# Patient Record
Sex: FEMALE | Race: BLACK OR AFRICAN AMERICAN | NOT HISPANIC OR LATINO | Employment: OTHER | RURAL
[De-identification: names, ages, dates, MRNs, and addresses within clinical notes are randomized per-mention and may not be internally consistent; named-entity substitution may affect disease eponyms.]

---

## 2017-03-31 ENCOUNTER — HISTORICAL (OUTPATIENT)
Dept: ADMINISTRATIVE | Facility: HOSPITAL | Age: 62
End: 2017-03-31

## 2017-04-04 LAB
LAB AP GENERAL CAT - HISTORICAL: NORMAL
LAB AP INTERPRETATION/RESULT - HISTORICAL: NEGATIVE
LAB AP SPECIMEN ADEQUACY - HISTORICAL: NORMAL
LAB AP SPECIMEN SUBMITTED - HISTORICAL: NORMAL

## 2018-07-25 ENCOUNTER — HISTORICAL (OUTPATIENT)
Dept: ADMINISTRATIVE | Facility: HOSPITAL | Age: 63
End: 2018-07-25

## 2018-07-27 LAB
LAB AP CLINICAL INFORMATION: NORMAL
LAB AP GENERAL CAT - HISTORICAL: NORMAL
LAB AP INTERPRETATION/RESULT - HISTORICAL: NEGATIVE
LAB AP SPECIMEN ADEQUACY - HISTORICAL: NORMAL
LAB AP SPECIMEN SUBMITTED - HISTORICAL: NORMAL

## 2019-08-21 ENCOUNTER — HISTORICAL (OUTPATIENT)
Dept: ADMINISTRATIVE | Facility: HOSPITAL | Age: 64
End: 2019-08-21

## 2019-10-02 ENCOUNTER — HISTORICAL (OUTPATIENT)
Dept: ADMINISTRATIVE | Facility: HOSPITAL | Age: 64
End: 2019-10-02

## 2019-10-02 LAB — CRC RECOMMENDATION EXT: NORMAL

## 2019-10-03 LAB
LAB AP CLINICAL INFORMATION: NORMAL
LAB AP DIAGNOSIS - HISTORICAL: NORMAL
LAB AP GROSS PATHOLOGY - HISTORICAL: NORMAL
LAB AP SPECIMEN SUBMITTED - HISTORICAL: NORMAL

## 2020-09-24 ENCOUNTER — HISTORICAL (OUTPATIENT)
Dept: ADMINISTRATIVE | Facility: HOSPITAL | Age: 65
End: 2020-09-24

## 2021-03-15 VITALS
HEART RATE: 67 BPM | DIASTOLIC BLOOD PRESSURE: 82 MMHG | HEIGHT: 65 IN | SYSTOLIC BLOOD PRESSURE: 132 MMHG | WEIGHT: 198 LBS | RESPIRATION RATE: 18 BRPM | OXYGEN SATURATION: 99 % | BODY MASS INDEX: 32.99 KG/M2

## 2021-03-15 RX ORDER — PROPRANOLOL HYDROCHLORIDE 120 MG/1
120 CAPSULE, EXTENDED RELEASE ORAL DAILY
COMMUNITY
End: 2022-03-08 | Stop reason: SDUPTHER

## 2021-03-15 RX ORDER — ZOLPIDEM TARTRATE 5 MG/1
5 TABLET ORAL NIGHTLY PRN
COMMUNITY

## 2021-03-15 RX ORDER — HYDRALAZINE HYDROCHLORIDE 10 MG/1
10 TABLET, FILM COATED ORAL EVERY 12 HOURS
COMMUNITY
End: 2022-03-08 | Stop reason: SDUPTHER

## 2021-03-15 RX ORDER — MONTELUKAST SODIUM 10 MG/1
10 TABLET ORAL NIGHTLY
COMMUNITY
End: 2021-09-07 | Stop reason: SDUPTHER

## 2021-03-15 RX ORDER — LOSARTAN POTASSIUM AND HYDROCHLOROTHIAZIDE 12.5; 1 MG/1; MG/1
1 TABLET ORAL DAILY
COMMUNITY
End: 2022-03-08 | Stop reason: SDUPTHER

## 2021-03-15 RX ORDER — CYCLOBENZAPRINE HCL 10 MG
10 TABLET ORAL 3 TIMES DAILY PRN
COMMUNITY
End: 2022-03-29 | Stop reason: SDUPTHER

## 2021-03-15 RX ORDER — FLUTICASONE PROPIONATE AND SALMETEROL 500; 50 UG/1; UG/1
1 POWDER RESPIRATORY (INHALATION) 2 TIMES DAILY
COMMUNITY
End: 2021-03-22 | Stop reason: SDUPTHER

## 2021-03-15 RX ORDER — PREGABALIN 50 MG/1
50 CAPSULE ORAL 2 TIMES DAILY
COMMUNITY
End: 2021-10-13 | Stop reason: SDUPTHER

## 2021-03-15 RX ORDER — TRAMADOL HYDROCHLORIDE 50 MG/1
50 TABLET ORAL EVERY 6 HOURS
COMMUNITY
End: 2023-10-10

## 2021-03-15 RX ORDER — MELOXICAM 15 MG/1
15 TABLET ORAL DAILY
COMMUNITY
End: 2022-04-21 | Stop reason: SDUPTHER

## 2021-03-15 RX ORDER — ALBUTEROL SULFATE 90 UG/1
2 AEROSOL, METERED RESPIRATORY (INHALATION) EVERY 4 HOURS PRN
COMMUNITY
End: 2021-03-22 | Stop reason: SDUPTHER

## 2021-03-22 ENCOUNTER — OFFICE VISIT (OUTPATIENT)
Dept: PULMONOLOGY | Facility: CLINIC | Age: 66
End: 2021-03-22
Payer: MEDICARE

## 2021-03-22 VITALS
DIASTOLIC BLOOD PRESSURE: 78 MMHG | HEIGHT: 65 IN | RESPIRATION RATE: 14 BRPM | HEART RATE: 75 BPM | WEIGHT: 198 LBS | OXYGEN SATURATION: 98 % | BODY MASS INDEX: 32.99 KG/M2 | SYSTOLIC BLOOD PRESSURE: 126 MMHG

## 2021-03-22 DIAGNOSIS — J45.20 MILD INTERMITTENT ASTHMA WITHOUT COMPLICATION: Primary | Chronic | ICD-10-CM

## 2021-03-22 DIAGNOSIS — Z86.711 HISTORY OF PULMONARY EMBOLUS (PE): ICD-10-CM

## 2021-03-22 PROCEDURE — 3008F PR BODY MASS INDEX (BMI) DOCUMENTED: ICD-10-PCS | Mod: CPTII,,, | Performed by: INTERNAL MEDICINE

## 2021-03-22 PROCEDURE — 99215 OFFICE O/P EST HI 40 MIN: CPT | Mod: PBBFAC | Performed by: INTERNAL MEDICINE

## 2021-03-22 PROCEDURE — 99999 PR PBB SHADOW E&M-EST. PATIENT-LVL V: ICD-10-PCS | Mod: PBBFAC,,, | Performed by: INTERNAL MEDICINE

## 2021-03-22 PROCEDURE — 1126F AMNT PAIN NOTED NONE PRSNT: CPT | Mod: ,,, | Performed by: INTERNAL MEDICINE

## 2021-03-22 PROCEDURE — 3008F BODY MASS INDEX DOCD: CPT | Mod: CPTII,,, | Performed by: INTERNAL MEDICINE

## 2021-03-22 PROCEDURE — 99214 OFFICE O/P EST MOD 30 MIN: CPT | Mod: S$PBB,,, | Performed by: INTERNAL MEDICINE

## 2021-03-22 PROCEDURE — 99999 PR PBB SHADOW E&M-EST. PATIENT-LVL V: CPT | Mod: PBBFAC,,, | Performed by: INTERNAL MEDICINE

## 2021-03-22 PROCEDURE — 1126F PR PAIN SEVERITY QUANTIFIED, NO PAIN PRESENT: ICD-10-PCS | Mod: ,,, | Performed by: INTERNAL MEDICINE

## 2021-03-22 PROCEDURE — 99214 PR OFFICE/OUTPT VISIT, EST, LEVL IV, 30-39 MIN: ICD-10-PCS | Mod: S$PBB,,, | Performed by: INTERNAL MEDICINE

## 2021-03-22 RX ORDER — FLUTICASONE PROPIONATE AND SALMETEROL 500; 50 UG/1; UG/1
1 POWDER RESPIRATORY (INHALATION) 2 TIMES DAILY
Qty: 1 INHALER | Refills: 6 | Status: SHIPPED | OUTPATIENT
Start: 2021-03-22 | End: 2021-09-07 | Stop reason: SDUPTHER

## 2021-03-22 RX ORDER — ALBUTEROL SULFATE 90 UG/1
2 AEROSOL, METERED RESPIRATORY (INHALATION) EVERY 4 HOURS PRN
Qty: 18 G | Refills: 6 | Status: SHIPPED | OUTPATIENT
Start: 2021-03-22 | End: 2021-09-07 | Stop reason: SDUPTHER

## 2021-09-07 ENCOUNTER — OFFICE VISIT (OUTPATIENT)
Dept: PULMONOLOGY | Facility: CLINIC | Age: 66
End: 2021-09-07
Payer: MEDICARE

## 2021-09-07 VITALS
BODY MASS INDEX: 32.82 KG/M2 | DIASTOLIC BLOOD PRESSURE: 90 MMHG | HEIGHT: 65 IN | OXYGEN SATURATION: 99 % | SYSTOLIC BLOOD PRESSURE: 152 MMHG | RESPIRATION RATE: 17 BRPM | TEMPERATURE: 98 F | HEART RATE: 72 BPM | WEIGHT: 197 LBS

## 2021-09-07 DIAGNOSIS — J30.1 NON-SEASONAL ALLERGIC RHINITIS DUE TO POLLEN: ICD-10-CM

## 2021-09-07 DIAGNOSIS — J45.20 MILD INTERMITTENT ASTHMA WITHOUT COMPLICATION: Primary | Chronic | ICD-10-CM

## 2021-09-07 PROCEDURE — 3008F PR BODY MASS INDEX (BMI) DOCUMENTED: ICD-10-PCS | Mod: CPTII,,, | Performed by: INTERNAL MEDICINE

## 2021-09-07 PROCEDURE — 3080F DIAST BP >= 90 MM HG: CPT | Mod: CPTII,,, | Performed by: INTERNAL MEDICINE

## 2021-09-07 PROCEDURE — 3080F PR MOST RECENT DIASTOLIC BLOOD PRESSURE >= 90 MM HG: ICD-10-PCS | Mod: CPTII,,, | Performed by: INTERNAL MEDICINE

## 2021-09-07 PROCEDURE — 1159F MED LIST DOCD IN RCRD: CPT | Mod: CPTII,,, | Performed by: INTERNAL MEDICINE

## 2021-09-07 PROCEDURE — 99215 OFFICE O/P EST HI 40 MIN: CPT | Mod: PBBFAC | Performed by: INTERNAL MEDICINE

## 2021-09-07 PROCEDURE — 3288F PR FALLS RISK ASSESSMENT DOCUMENTED: ICD-10-PCS | Mod: CPTII,,, | Performed by: INTERNAL MEDICINE

## 2021-09-07 PROCEDURE — 99213 OFFICE O/P EST LOW 20 MIN: CPT | Mod: S$PBB,,, | Performed by: INTERNAL MEDICINE

## 2021-09-07 PROCEDURE — 1159F PR MEDICATION LIST DOCUMENTED IN MEDICAL RECORD: ICD-10-PCS | Mod: CPTII,,, | Performed by: INTERNAL MEDICINE

## 2021-09-07 PROCEDURE — 3077F PR MOST RECENT SYSTOLIC BLOOD PRESSURE >= 140 MM HG: ICD-10-PCS | Mod: CPTII,,, | Performed by: INTERNAL MEDICINE

## 2021-09-07 PROCEDURE — 3077F SYST BP >= 140 MM HG: CPT | Mod: CPTII,,, | Performed by: INTERNAL MEDICINE

## 2021-09-07 PROCEDURE — 1160F PR REVIEW ALL MEDS BY PRESCRIBER/CLIN PHARMACIST DOCUMENTED: ICD-10-PCS | Mod: CPTII,,, | Performed by: INTERNAL MEDICINE

## 2021-09-07 PROCEDURE — 1101F PT FALLS ASSESS-DOCD LE1/YR: CPT | Mod: CPTII,,, | Performed by: INTERNAL MEDICINE

## 2021-09-07 PROCEDURE — 1101F PR PT FALLS ASSESS DOC 0-1 FALLS W/OUT INJ PAST YR: ICD-10-PCS | Mod: CPTII,,, | Performed by: INTERNAL MEDICINE

## 2021-09-07 PROCEDURE — 3008F BODY MASS INDEX DOCD: CPT | Mod: CPTII,,, | Performed by: INTERNAL MEDICINE

## 2021-09-07 PROCEDURE — 99213 PR OFFICE/OUTPT VISIT, EST, LEVL III, 20-29 MIN: ICD-10-PCS | Mod: S$PBB,,, | Performed by: INTERNAL MEDICINE

## 2021-09-07 PROCEDURE — 3288F FALL RISK ASSESSMENT DOCD: CPT | Mod: CPTII,,, | Performed by: INTERNAL MEDICINE

## 2021-09-07 PROCEDURE — 1160F RVW MEDS BY RX/DR IN RCRD: CPT | Mod: CPTII,,, | Performed by: INTERNAL MEDICINE

## 2021-09-07 RX ORDER — FLUTICASONE PROPIONATE 50 MCG
1 SPRAY, SUSPENSION (ML) NASAL 2 TIMES DAILY
Qty: 16 G | Refills: 4 | Status: SHIPPED | OUTPATIENT
Start: 2021-09-07 | End: 2023-10-10

## 2021-09-07 RX ORDER — ASPIRIN 81 MG/1
81 TABLET ORAL DAILY
COMMUNITY
End: 2022-10-25 | Stop reason: ALTCHOICE

## 2021-09-07 RX ORDER — MONTELUKAST SODIUM 10 MG/1
10 TABLET ORAL NIGHTLY
Qty: 90 TABLET | Refills: 3 | Status: SHIPPED | OUTPATIENT
Start: 2021-09-07 | End: 2022-04-21 | Stop reason: SDUPTHER

## 2021-09-07 RX ORDER — ALBUTEROL SULFATE 90 UG/1
2 AEROSOL, METERED RESPIRATORY (INHALATION) EVERY 4 HOURS PRN
Qty: 18 G | Refills: 6 | Status: SHIPPED | OUTPATIENT
Start: 2021-09-07 | End: 2022-05-24

## 2021-09-07 RX ORDER — FLUTICASONE PROPIONATE AND SALMETEROL 500; 50 UG/1; UG/1
1 POWDER RESPIRATORY (INHALATION) 2 TIMES DAILY
Qty: 1 INHALER | Refills: 6 | Status: SHIPPED | OUTPATIENT
Start: 2021-09-07 | End: 2022-03-17 | Stop reason: DRUGHIGH

## 2021-09-09 ENCOUNTER — OFFICE VISIT (OUTPATIENT)
Dept: OBSTETRICS AND GYNECOLOGY | Facility: CLINIC | Age: 66
End: 2021-09-09
Payer: MEDICARE

## 2021-09-09 VITALS
DIASTOLIC BLOOD PRESSURE: 86 MMHG | WEIGHT: 197.63 LBS | HEIGHT: 65 IN | BODY MASS INDEX: 32.93 KG/M2 | SYSTOLIC BLOOD PRESSURE: 130 MMHG

## 2021-09-09 DIAGNOSIS — Z01.419 WOMEN'S ANNUAL ROUTINE GYNECOLOGICAL EXAMINATION: Primary | ICD-10-CM

## 2021-09-09 DIAGNOSIS — Z12.72 SPECIAL SCREENING FOR MALIGNANT NEOPLASMS, VAGINA: ICD-10-CM

## 2021-09-09 PROCEDURE — 1159F MED LIST DOCD IN RCRD: CPT | Mod: CPTII,,, | Performed by: OBSTETRICS & GYNECOLOGY

## 2021-09-09 PROCEDURE — 99214 OFFICE O/P EST MOD 30 MIN: CPT | Mod: PBBFAC | Performed by: OBSTETRICS & GYNECOLOGY

## 2021-09-09 PROCEDURE — 3075F SYST BP GE 130 - 139MM HG: CPT | Mod: CPTII,,, | Performed by: OBSTETRICS & GYNECOLOGY

## 2021-09-09 PROCEDURE — 3008F PR BODY MASS INDEX (BMI) DOCUMENTED: ICD-10-PCS | Mod: CPTII,,, | Performed by: OBSTETRICS & GYNECOLOGY

## 2021-09-09 PROCEDURE — 3075F PR MOST RECENT SYSTOLIC BLOOD PRESS GE 130-139MM HG: ICD-10-PCS | Mod: CPTII,,, | Performed by: OBSTETRICS & GYNECOLOGY

## 2021-09-09 PROCEDURE — 1159F PR MEDICATION LIST DOCUMENTED IN MEDICAL RECORD: ICD-10-PCS | Mod: CPTII,,, | Performed by: OBSTETRICS & GYNECOLOGY

## 2021-09-09 PROCEDURE — 1160F PR REVIEW ALL MEDS BY PRESCRIBER/CLIN PHARMACIST DOCUMENTED: ICD-10-PCS | Mod: CPTII,,, | Performed by: OBSTETRICS & GYNECOLOGY

## 2021-09-09 PROCEDURE — 3079F DIAST BP 80-89 MM HG: CPT | Mod: CPTII,,, | Performed by: OBSTETRICS & GYNECOLOGY

## 2021-09-09 PROCEDURE — 88142 CYTOPATH C/V THIN LAYER: CPT | Mod: GCY | Performed by: OBSTETRICS & GYNECOLOGY

## 2021-09-09 PROCEDURE — G0101 PR CA SCREEN;PELVIC/BREAST EXAM: ICD-10-PCS | Mod: S$PBB,,, | Performed by: OBSTETRICS & GYNECOLOGY

## 2021-09-09 PROCEDURE — 1160F RVW MEDS BY RX/DR IN RCRD: CPT | Mod: CPTII,,, | Performed by: OBSTETRICS & GYNECOLOGY

## 2021-09-09 PROCEDURE — 3008F BODY MASS INDEX DOCD: CPT | Mod: CPTII,,, | Performed by: OBSTETRICS & GYNECOLOGY

## 2021-09-09 PROCEDURE — 3079F PR MOST RECENT DIASTOLIC BLOOD PRESSURE 80-89 MM HG: ICD-10-PCS | Mod: CPTII,,, | Performed by: OBSTETRICS & GYNECOLOGY

## 2021-09-09 PROCEDURE — G0101 CA SCREEN;PELVIC/BREAST EXAM: HCPCS | Mod: S$PBB,,, | Performed by: OBSTETRICS & GYNECOLOGY

## 2021-09-13 LAB
GH SERPL-MCNC: NORMAL NG/ML
INSULIN SERPL-ACNC: NORMAL U[IU]/ML
LAB AP CLINICAL INFORMATION: NORMAL
LAB AP GYN INTERPRETATION: NEGATIVE
LAB AP PAP DISCLAIMER COMMENTS: NORMAL
RENIN PLAS-CCNC: NORMAL NG/ML/H

## 2021-09-30 ENCOUNTER — HOSPITAL ENCOUNTER (OUTPATIENT)
Dept: RADIOLOGY | Facility: HOSPITAL | Age: 66
Discharge: HOME | End: 2021-09-30
Payer: MEDICARE

## 2021-09-30 VITALS — WEIGHT: 197 LBS | BODY MASS INDEX: 32.82 KG/M2 | HEIGHT: 65 IN

## 2021-09-30 DIAGNOSIS — Z12.31 VISIT FOR SCREENING MAMMOGRAM: ICD-10-CM

## 2021-09-30 PROCEDURE — 77067 SCR MAMMO BI INCL CAD: CPT | Mod: TC,52

## 2021-09-30 PROCEDURE — 77067 MAMMO DIGITAL SCREENING LEFT: ICD-10-PCS | Mod: 26,52,, | Performed by: RADIOLOGY

## 2021-09-30 PROCEDURE — 77067 SCR MAMMO BI INCL CAD: CPT | Mod: 26,52,, | Performed by: RADIOLOGY

## 2021-10-13 ENCOUNTER — OFFICE VISIT (OUTPATIENT)
Dept: INTERNAL MEDICINE | Facility: CLINIC | Age: 66
End: 2021-10-13
Payer: MEDICARE

## 2021-10-13 VITALS
OXYGEN SATURATION: 98 % | WEIGHT: 197 LBS | RESPIRATION RATE: 16 BRPM | HEIGHT: 65 IN | DIASTOLIC BLOOD PRESSURE: 80 MMHG | SYSTOLIC BLOOD PRESSURE: 132 MMHG | BODY MASS INDEX: 32.82 KG/M2 | HEART RATE: 74 BPM

## 2021-10-13 DIAGNOSIS — J45.20 MILD INTERMITTENT ASTHMA WITHOUT COMPLICATION: ICD-10-CM

## 2021-10-13 DIAGNOSIS — M25.551 RIGHT HIP PAIN: ICD-10-CM

## 2021-10-13 DIAGNOSIS — K21.9 GASTROESOPHAGEAL REFLUX DISEASE, UNSPECIFIED WHETHER ESOPHAGITIS PRESENT: ICD-10-CM

## 2021-10-13 DIAGNOSIS — I10 ESSENTIAL HYPERTENSION: ICD-10-CM

## 2021-10-13 DIAGNOSIS — Z76.89 ENCOUNTER TO ESTABLISH CARE WITH NEW DOCTOR: Primary | ICD-10-CM

## 2021-10-13 PROCEDURE — 3079F PR MOST RECENT DIASTOLIC BLOOD PRESSURE 80-89 MM HG: ICD-10-PCS | Mod: CPTII,,, | Performed by: INTERNAL MEDICINE

## 2021-10-13 PROCEDURE — 1100F PR PT FALLS ASSESS DOC 2+ FALLS/FALL W/INJURY/YR: ICD-10-PCS | Mod: CPTII,,, | Performed by: INTERNAL MEDICINE

## 2021-10-13 PROCEDURE — 3288F FALL RISK ASSESSMENT DOCD: CPT | Mod: CPTII,,, | Performed by: INTERNAL MEDICINE

## 2021-10-13 PROCEDURE — 3288F PR FALLS RISK ASSESSMENT DOCUMENTED: ICD-10-PCS | Mod: CPTII,,, | Performed by: INTERNAL MEDICINE

## 2021-10-13 PROCEDURE — 1159F PR MEDICATION LIST DOCUMENTED IN MEDICAL RECORD: ICD-10-PCS | Mod: CPTII,,, | Performed by: INTERNAL MEDICINE

## 2021-10-13 PROCEDURE — 3008F BODY MASS INDEX DOCD: CPT | Mod: CPTII,,, | Performed by: INTERNAL MEDICINE

## 2021-10-13 PROCEDURE — 1159F MED LIST DOCD IN RCRD: CPT | Mod: CPTII,,, | Performed by: INTERNAL MEDICINE

## 2021-10-13 PROCEDURE — 99204 PR OFFICE/OUTPT VISIT, NEW, LEVL IV, 45-59 MIN: ICD-10-PCS | Mod: S$PBB,,, | Performed by: INTERNAL MEDICINE

## 2021-10-13 PROCEDURE — 3075F SYST BP GE 130 - 139MM HG: CPT | Mod: CPTII,,, | Performed by: INTERNAL MEDICINE

## 2021-10-13 PROCEDURE — 3079F DIAST BP 80-89 MM HG: CPT | Mod: CPTII,,, | Performed by: INTERNAL MEDICINE

## 2021-10-13 PROCEDURE — 99204 OFFICE O/P NEW MOD 45 MIN: CPT | Mod: S$PBB,,, | Performed by: INTERNAL MEDICINE

## 2021-10-13 PROCEDURE — 1100F PTFALLS ASSESS-DOCD GE2>/YR: CPT | Mod: CPTII,,, | Performed by: INTERNAL MEDICINE

## 2021-10-13 PROCEDURE — 99215 OFFICE O/P EST HI 40 MIN: CPT | Mod: PBBFAC | Performed by: INTERNAL MEDICINE

## 2021-10-13 PROCEDURE — 3075F PR MOST RECENT SYSTOLIC BLOOD PRESS GE 130-139MM HG: ICD-10-PCS | Mod: CPTII,,, | Performed by: INTERNAL MEDICINE

## 2021-10-13 PROCEDURE — 3008F PR BODY MASS INDEX (BMI) DOCUMENTED: ICD-10-PCS | Mod: CPTII,,, | Performed by: INTERNAL MEDICINE

## 2021-10-13 RX ORDER — PREGABALIN 50 MG/1
50 CAPSULE ORAL 2 TIMES DAILY
Qty: 180 CAPSULE | Refills: 3 | Status: SHIPPED | OUTPATIENT
Start: 2021-10-13 | End: 2022-06-30 | Stop reason: SDUPTHER

## 2021-10-13 RX ORDER — METHOCARBAMOL 500 MG/1
500 TABLET, FILM COATED ORAL 4 TIMES DAILY
Qty: 40 TABLET | Refills: 2 | Status: SHIPPED | OUTPATIENT
Start: 2021-10-13 | End: 2021-11-12 | Stop reason: SDUPTHER

## 2021-11-03 ENCOUNTER — HOSPITAL ENCOUNTER (OUTPATIENT)
Dept: RADIOLOGY | Facility: HOSPITAL | Age: 66
Discharge: HOME | End: 2021-11-03
Attending: INTERNAL MEDICINE
Payer: MEDICARE

## 2021-11-03 DIAGNOSIS — M25.551 RIGHT HIP PAIN: ICD-10-CM

## 2021-11-03 PROCEDURE — 73502 XR HIP WITH PELVIS WHEN PERFORMED, 2 OR 3  VIEWS RIGHT: ICD-10-PCS | Mod: 26,RT,, | Performed by: RADIOLOGY

## 2021-11-03 PROCEDURE — 73502 X-RAY EXAM HIP UNI 2-3 VIEWS: CPT | Mod: 26,RT,, | Performed by: RADIOLOGY

## 2021-11-03 PROCEDURE — 73502 X-RAY EXAM HIP UNI 2-3 VIEWS: CPT | Mod: TC,RT

## 2022-03-08 RX ORDER — HYDRALAZINE HYDROCHLORIDE 10 MG/1
10 TABLET, FILM COATED ORAL EVERY 12 HOURS
Qty: 180 TABLET | Refills: 3 | Status: SHIPPED | OUTPATIENT
Start: 2022-03-08 | End: 2022-10-25 | Stop reason: SDUPTHER

## 2022-03-08 RX ORDER — LOSARTAN POTASSIUM AND HYDROCHLOROTHIAZIDE 12.5; 1 MG/1; MG/1
1 TABLET ORAL DAILY
Qty: 90 TABLET | Refills: 3 | Status: SHIPPED | OUTPATIENT
Start: 2022-03-08 | End: 2022-10-25 | Stop reason: SDUPTHER

## 2022-03-08 RX ORDER — PROPRANOLOL HYDROCHLORIDE 120 MG/1
120 CAPSULE, EXTENDED RELEASE ORAL DAILY
Qty: 90 CAPSULE | Refills: 3 | Status: SHIPPED | OUTPATIENT
Start: 2022-03-08 | End: 2022-10-25 | Stop reason: SDUPTHER

## 2022-03-08 NOTE — TELEPHONE ENCOUNTER
----- Message from Malini Xiong sent at 3/8/2022  9:48 AM CST -----  Need refill on propranolol, losartan and hydralazine

## 2022-03-17 ENCOUNTER — OFFICE VISIT (OUTPATIENT)
Dept: PULMONOLOGY | Facility: CLINIC | Age: 67
End: 2022-03-17
Payer: MEDICARE

## 2022-03-17 VITALS
DIASTOLIC BLOOD PRESSURE: 88 MMHG | WEIGHT: 202 LBS | HEIGHT: 65 IN | HEART RATE: 79 BPM | OXYGEN SATURATION: 98 % | SYSTOLIC BLOOD PRESSURE: 144 MMHG | RESPIRATION RATE: 14 BRPM | BODY MASS INDEX: 33.66 KG/M2

## 2022-03-17 DIAGNOSIS — J45.20 MILD INTERMITTENT ASTHMA WITHOUT COMPLICATION: Primary | Chronic | ICD-10-CM

## 2022-03-17 DIAGNOSIS — J30.9 ALLERGIC SINUSITIS: Chronic | ICD-10-CM

## 2022-03-17 PROCEDURE — 1126F PR PAIN SEVERITY QUANTIFIED, NO PAIN PRESENT: ICD-10-PCS | Mod: CPTII,,, | Performed by: INTERNAL MEDICINE

## 2022-03-17 PROCEDURE — 99213 PR OFFICE/OUTPT VISIT, EST, LEVL III, 20-29 MIN: ICD-10-PCS | Mod: S$PBB,,, | Performed by: INTERNAL MEDICINE

## 2022-03-17 PROCEDURE — 1101F PR PT FALLS ASSESS DOC 0-1 FALLS W/OUT INJ PAST YR: ICD-10-PCS | Mod: CPTII,,, | Performed by: INTERNAL MEDICINE

## 2022-03-17 PROCEDURE — 1160F RVW MEDS BY RX/DR IN RCRD: CPT | Mod: CPTII,,, | Performed by: INTERNAL MEDICINE

## 2022-03-17 PROCEDURE — 3008F BODY MASS INDEX DOCD: CPT | Mod: CPTII,,, | Performed by: INTERNAL MEDICINE

## 2022-03-17 PROCEDURE — 3079F PR MOST RECENT DIASTOLIC BLOOD PRESSURE 80-89 MM HG: ICD-10-PCS | Mod: CPTII,,, | Performed by: INTERNAL MEDICINE

## 2022-03-17 PROCEDURE — 99213 OFFICE O/P EST LOW 20 MIN: CPT | Mod: S$PBB,,, | Performed by: INTERNAL MEDICINE

## 2022-03-17 PROCEDURE — 3077F SYST BP >= 140 MM HG: CPT | Mod: CPTII,,, | Performed by: INTERNAL MEDICINE

## 2022-03-17 PROCEDURE — 1159F PR MEDICATION LIST DOCUMENTED IN MEDICAL RECORD: ICD-10-PCS | Mod: CPTII,,, | Performed by: INTERNAL MEDICINE

## 2022-03-17 PROCEDURE — 1159F MED LIST DOCD IN RCRD: CPT | Mod: CPTII,,, | Performed by: INTERNAL MEDICINE

## 2022-03-17 PROCEDURE — 3008F PR BODY MASS INDEX (BMI) DOCUMENTED: ICD-10-PCS | Mod: CPTII,,, | Performed by: INTERNAL MEDICINE

## 2022-03-17 PROCEDURE — 3077F PR MOST RECENT SYSTOLIC BLOOD PRESSURE >= 140 MM HG: ICD-10-PCS | Mod: CPTII,,, | Performed by: INTERNAL MEDICINE

## 2022-03-17 PROCEDURE — 1126F AMNT PAIN NOTED NONE PRSNT: CPT | Mod: CPTII,,, | Performed by: INTERNAL MEDICINE

## 2022-03-17 PROCEDURE — 1101F PT FALLS ASSESS-DOCD LE1/YR: CPT | Mod: CPTII,,, | Performed by: INTERNAL MEDICINE

## 2022-03-17 PROCEDURE — 99215 OFFICE O/P EST HI 40 MIN: CPT | Mod: PBBFAC | Performed by: INTERNAL MEDICINE

## 2022-03-17 PROCEDURE — 1160F PR REVIEW ALL MEDS BY PRESCRIBER/CLIN PHARMACIST DOCUMENTED: ICD-10-PCS | Mod: CPTII,,, | Performed by: INTERNAL MEDICINE

## 2022-03-17 PROCEDURE — 3288F FALL RISK ASSESSMENT DOCD: CPT | Mod: CPTII,,, | Performed by: INTERNAL MEDICINE

## 2022-03-17 PROCEDURE — 3079F DIAST BP 80-89 MM HG: CPT | Mod: CPTII,,, | Performed by: INTERNAL MEDICINE

## 2022-03-17 PROCEDURE — 3288F PR FALLS RISK ASSESSMENT DOCUMENTED: ICD-10-PCS | Mod: CPTII,,, | Performed by: INTERNAL MEDICINE

## 2022-03-17 RX ORDER — FLUTICASONE PROPIONATE AND SALMETEROL 250; 50 UG/1; UG/1
1 POWDER RESPIRATORY (INHALATION) 2 TIMES DAILY
Qty: 60 EACH | Refills: 11 | Status: SHIPPED | OUTPATIENT
Start: 2022-03-17 | End: 2022-09-09

## 2022-03-17 NOTE — PROGRESS NOTES
Subjective:       Patient ID: Kash Dumont is a 66 y.o. female.    Chief Complaint: Asthma (6 month )    66-year-old female who presents for follow-up of mild intermittent asthma.  She continues on Advair along with Singulair.  She uses albuterol a few times per week.  Continues to have issues with sinuses and postnasal drip.  She is using flonase and Singulair for this.  Lifelong nonsmoker.  Breast cancer 2014 now in remission.  Prior PE completed treatment with Coumadin. Very well controlled from an asthma standpoint.    Asthma  There is no cough, hemoptysis, shortness of breath, sputum production or wheezing. Associated symptoms include postnasal drip. Pertinent negatives include no appetite change, chest pain, fever, headaches, myalgias, orthopnea, rhinorrhea or sore throat. Her past medical history is significant for asthma.     Social History     Tobacco Use    Smoking status: Never Smoker    Smokeless tobacco: Never Used   Substance Use Topics    Alcohol use: Never    Drug use: Never      Review of Systems   Constitutional: Negative for fever, chills, activity change and appetite change.   HENT: Positive for postnasal drip and congestion. Negative for rhinorrhea, sore throat and voice change.    Respiratory: Negative for apnea, cough, hemoptysis, sputum production, chest tightness, shortness of breath, wheezing, orthopnea and dyspnea on extertion.    Cardiovascular: Negative for chest pain, palpitations and leg swelling.   Musculoskeletal: Negative for arthralgias, back pain, joint swelling and myalgias.   Skin: Negative for rash.   Gastrointestinal: Negative for nausea, vomiting, abdominal pain and acid reflux.   Neurological: Negative for syncope, weakness, light-headedness and headaches.   Hematological: Negative for adenopathy. Does not bruise/bleed easily and no excessive bruising.   Psychiatric/Behavioral: Negative for confusion and sleep disturbance. The patient is not nervous/anxious.         Objective:      Physical Exam   Constitutional: She is oriented to person, place, and time. She appears well-developed. No distress. She is obese.   HENT:   Head: Normocephalic.   Nose: Nose normal.   Neck: No JVD present. No tracheal deviation present. No thyromegaly present.   Cardiovascular: Normal rate, regular rhythm and normal heart sounds.   Pulmonary/Chest: Effort normal and breath sounds normal. No respiratory distress. She has no wheezes. She has no rhonchi. She has no rales.   Abdominal: Soft. Bowel sounds are normal. There is no abdominal tenderness.   Musculoskeletal:         General: No deformity or edema.      Cervical back: Neck supple.   Lymphadenopathy: No supraclavicular adenopathy is present.     She has no cervical adenopathy.   Neurological: She is alert and oriented to person, place, and time. No cranial nerve deficit.   Skin: Skin is warm and dry. No rash noted.   Psychiatric: She has a normal mood and affect. Her behavior is normal.   Vitals reviewed.    Personal Diagnostic Review  No new results    No flowsheet data found.      Assessment:       1. Mild intermittent asthma without complication    2. Allergic sinusitis        Outpatient Encounter Medications as of 3/17/2022   Medication Sig Dispense Refill    albuterol (PROVENTIL/VENTOLIN HFA) 90 mcg/actuation inhaler Inhale 2 puffs into the lungs every 4 (four) hours as needed for Wheezing. Rescue 18 g 6    cyclobenzaprine (FLEXERIL) 10 MG tablet Take 10 mg by mouth 3 (three) times daily as needed for Muscle spasms.      fluticasone propionate (FLONASE) 50 mcg/actuation nasal spray 1 spray (50 mcg total) by Each Nostril route 2 (two) times a day. 16 g 4    hydrALAZINE (APRESOLINE) 10 MG tablet Take 1 tablet (10 mg total) by mouth every 12 (twelve) hours. 180 tablet 3    losartan-hydrochlorothiazide 100-12.5 mg (HYZAAR) 100-12.5 mg Tab Take 1 tablet by mouth once daily. 90 tablet 3    meloxicam (MOBIC) 15 MG tablet Take 15 mg by  mouth once daily.      methocarbamoL (ROBAXIN) 500 MG Tab TAKE 1 TABLET BY MOUTH 4 TIMES DAILY FOR 10 DAYS *CAUSES DROWSINESS-AVOID ALCOHOL!! 40 tablet 2    montelukast (SINGULAIR) 10 mg tablet Take 1 tablet (10 mg total) by mouth every evening. 90 tablet 3    pregabalin (LYRICA) 50 MG capsule Take 1 capsule (50 mg total) by mouth 2 (two) times daily. 180 capsule 3    propranoloL (INDERAL LA) 120 MG 24 hr capsule Take 1 capsule (120 mg total) by mouth once daily. 90 capsule 3    traMADoL (ULTRAM) 50 mg tablet Take 50 mg by mouth every 6 (six) hours.      zolpidem (AMBIEN) 5 MG Tab Take 5 mg by mouth nightly as needed.      [DISCONTINUED] fluticasone-salmeterol diskus inhaler 500-50 mcg Inhale 1 puff into the lungs 2 (two) times daily. Controller 1 Inhaler 6    aspirin (ECOTRIN) 81 MG EC tablet Take 81 mg by mouth once daily.      fluticasone-salmeterol diskus inhaler 250-50 mcg Inhale 1 puff into the lungs 2 (two) times daily. Controller 60 each 11     No facility-administered encounter medications on file as of 3/17/2022.     No orders of the defined types were placed in this encounter.      Plan:       Asthma  - continue advair, singulair. Decrease advair to 250 dosage since so well controlled. Plan to decrease to 100 dose next visit if remains controlled.  - albuterol PRN    Sinusitis  - continue singulair qpm, flonase daily  - recommend OTC second generation antihistamine during allergy season in am.         RTC in 6 months

## 2022-03-30 RX ORDER — CYCLOBENZAPRINE HCL 10 MG
10 TABLET ORAL 3 TIMES DAILY PRN
Qty: 45 TABLET | Refills: 1 | Status: SHIPPED | OUTPATIENT
Start: 2022-03-30 | End: 2022-10-25

## 2022-04-21 ENCOUNTER — OFFICE VISIT (OUTPATIENT)
Dept: INTERNAL MEDICINE | Facility: CLINIC | Age: 67
End: 2022-04-21
Payer: MEDICARE

## 2022-04-21 VITALS
HEIGHT: 65 IN | DIASTOLIC BLOOD PRESSURE: 66 MMHG | OXYGEN SATURATION: 98 % | WEIGHT: 210 LBS | RESPIRATION RATE: 16 BRPM | HEART RATE: 52 BPM | BODY MASS INDEX: 34.99 KG/M2 | SYSTOLIC BLOOD PRESSURE: 130 MMHG

## 2022-04-21 DIAGNOSIS — J45.20 MILD INTERMITTENT ASTHMA WITHOUT COMPLICATION: Chronic | ICD-10-CM

## 2022-04-21 DIAGNOSIS — K21.9 GASTROESOPHAGEAL REFLUX DISEASE, UNSPECIFIED WHETHER ESOPHAGITIS PRESENT: ICD-10-CM

## 2022-04-21 DIAGNOSIS — Z09 FOLLOW UP: Primary | ICD-10-CM

## 2022-04-21 DIAGNOSIS — M25.551 RIGHT HIP PAIN: ICD-10-CM

## 2022-04-21 DIAGNOSIS — I10 ESSENTIAL HYPERTENSION: ICD-10-CM

## 2022-04-21 DIAGNOSIS — J30.1 NON-SEASONAL ALLERGIC RHINITIS DUE TO POLLEN: ICD-10-CM

## 2022-04-21 PROCEDURE — 99214 PR OFFICE/OUTPT VISIT, EST, LEVL IV, 30-39 MIN: ICD-10-PCS | Mod: S$PBB,,, | Performed by: INTERNAL MEDICINE

## 2022-04-21 PROCEDURE — 3078F DIAST BP <80 MM HG: CPT | Mod: CPTII,,, | Performed by: INTERNAL MEDICINE

## 2022-04-21 PROCEDURE — 3008F BODY MASS INDEX DOCD: CPT | Mod: CPTII,,, | Performed by: INTERNAL MEDICINE

## 2022-04-21 PROCEDURE — 1159F MED LIST DOCD IN RCRD: CPT | Mod: CPTII,,, | Performed by: INTERNAL MEDICINE

## 2022-04-21 PROCEDURE — 3288F FALL RISK ASSESSMENT DOCD: CPT | Mod: CPTII,,, | Performed by: INTERNAL MEDICINE

## 2022-04-21 PROCEDURE — 1126F PR PAIN SEVERITY QUANTIFIED, NO PAIN PRESENT: ICD-10-PCS | Mod: CPTII,,, | Performed by: INTERNAL MEDICINE

## 2022-04-21 PROCEDURE — 1126F AMNT PAIN NOTED NONE PRSNT: CPT | Mod: CPTII,,, | Performed by: INTERNAL MEDICINE

## 2022-04-21 PROCEDURE — 3075F PR MOST RECENT SYSTOLIC BLOOD PRESS GE 130-139MM HG: ICD-10-PCS | Mod: CPTII,,, | Performed by: INTERNAL MEDICINE

## 2022-04-21 PROCEDURE — 1101F PR PT FALLS ASSESS DOC 0-1 FALLS W/OUT INJ PAST YR: ICD-10-PCS | Mod: CPTII,,, | Performed by: INTERNAL MEDICINE

## 2022-04-21 PROCEDURE — 1159F PR MEDICATION LIST DOCUMENTED IN MEDICAL RECORD: ICD-10-PCS | Mod: CPTII,,, | Performed by: INTERNAL MEDICINE

## 2022-04-21 PROCEDURE — 99214 OFFICE O/P EST MOD 30 MIN: CPT | Mod: PBBFAC | Performed by: INTERNAL MEDICINE

## 2022-04-21 PROCEDURE — 3075F SYST BP GE 130 - 139MM HG: CPT | Mod: CPTII,,, | Performed by: INTERNAL MEDICINE

## 2022-04-21 PROCEDURE — 3008F PR BODY MASS INDEX (BMI) DOCUMENTED: ICD-10-PCS | Mod: CPTII,,, | Performed by: INTERNAL MEDICINE

## 2022-04-21 PROCEDURE — 3288F PR FALLS RISK ASSESSMENT DOCUMENTED: ICD-10-PCS | Mod: CPTII,,, | Performed by: INTERNAL MEDICINE

## 2022-04-21 PROCEDURE — 1101F PT FALLS ASSESS-DOCD LE1/YR: CPT | Mod: CPTII,,, | Performed by: INTERNAL MEDICINE

## 2022-04-21 PROCEDURE — 3078F PR MOST RECENT DIASTOLIC BLOOD PRESSURE < 80 MM HG: ICD-10-PCS | Mod: CPTII,,, | Performed by: INTERNAL MEDICINE

## 2022-04-21 PROCEDURE — 99214 OFFICE O/P EST MOD 30 MIN: CPT | Mod: S$PBB,,, | Performed by: INTERNAL MEDICINE

## 2022-04-21 RX ORDER — METHOCARBAMOL 500 MG/1
TABLET, FILM COATED ORAL
Qty: 40 TABLET | Refills: 5 | Status: SHIPPED | OUTPATIENT
Start: 2022-04-21 | End: 2022-09-01

## 2022-04-21 RX ORDER — MELOXICAM 15 MG/1
15 TABLET ORAL DAILY
Qty: 90 TABLET | Refills: 3 | Status: SHIPPED | OUTPATIENT
Start: 2022-04-21 | End: 2022-10-25

## 2022-04-21 RX ORDER — MONTELUKAST SODIUM 10 MG/1
10 TABLET ORAL NIGHTLY
Qty: 90 TABLET | Refills: 3 | Status: SHIPPED | OUTPATIENT
Start: 2022-04-21 | End: 2022-10-25

## 2022-04-21 NOTE — PROGRESS NOTES
Subjective:       Patient ID: Kash Dumont is a 66 y.o. female.    Chief Complaint: Follow-up (6 month follow up)    The patient is a 66-year-old female that presents today to [facility] care.  She has a history of asthma which is well controlled, hypertension, osteoarthritis.  She is she is up-to-date on her health maintenance issues.  She does have a history of breast cancer in 2014. She had a right mastectomy.  She is in remission.  She has not received COVID vaccine.  We have discussed the pros and cons and I have encouraged her to get the vaccine.  Her main complaint today is of right hip pain which has been ongoing for several months.  She has a history of osteoarthritis and had her knee replaced in 2015. She denies any weakness.  Today she is resting comfortably in no distress.  She is afebrile and vital signs are stable.     4/21-The patient presents today for follow up. No significant changes in health since we last saw her. She still has not gotten the covid vaccine but she states that she plans to do that. She does admit to gaining a little weight but she has mostly been staying at home. We have discussed getting outside and doing some walking. She needs some refills. Hip pain is better. She has been using her rescue inhaler daily. Recently saw Dr. Ziegler and he made some changes in her regimen. Today she is resting comfortably in no distress. She is afebrile and vital signs are stable.    Follow-up  Associated symptoms include arthralgias. Pertinent negatives include no abdominal pain, chest pain, chills, congestion, coughing, fatigue, fever, headaches, joint swelling, myalgias, nausea, neck pain, rash, sore throat or weakness.     Review of Systems   Constitutional: Negative for appetite change, chills, fatigue and fever.   HENT: Negative for nasal congestion, ear pain, hearing loss, sinus pressure/congestion and sore throat.    Eyes: Negative for pain, redness and visual disturbance.   Respiratory:  Positive for wheezing. Negative for apnea, cough and shortness of breath.    Cardiovascular: Negative for chest pain and palpitations.   Gastrointestinal: Negative for abdominal pain, constipation, diarrhea and nausea.   Endocrine: Negative for cold intolerance, heat intolerance and polyuria.   Genitourinary: Negative for dysuria and hematuria.   Musculoskeletal: Positive for arthralgias. Negative for back pain, joint swelling, myalgias and neck pain.   Integumentary:  Negative for pallor, rash and wound.   Allergic/Immunologic: Negative for immunocompromised state.   Neurological: Negative for tremors, seizures, weakness, headaches and memory loss.   Hematological: Negative for adenopathy.   Psychiatric/Behavioral: Negative for confusion, dysphoric mood and sleep disturbance. The patient is not nervous/anxious.          Objective:      Physical Exam  Vitals and nursing note reviewed.   Constitutional:       General: She is not in acute distress.     Appearance: Normal appearance. She is obese. She is not ill-appearing.   HENT:      Head: Normocephalic and atraumatic.      Right Ear: External ear normal.      Left Ear: External ear normal.      Nose: Nose normal.      Mouth/Throat:      Pharynx: Oropharynx is clear.   Eyes:      Extraocular Movements: Extraocular movements intact.      Conjunctiva/sclera: Conjunctivae normal.      Pupils: Pupils are equal, round, and reactive to light.   Neck:      Vascular: No carotid bruit.   Cardiovascular:      Rate and Rhythm: Normal rate and regular rhythm.      Pulses: Normal pulses.      Heart sounds: Normal heart sounds. No murmur heard.  Pulmonary:      Effort: No respiratory distress.      Breath sounds: Normal breath sounds. No wheezing or rales.   Abdominal:      General: Bowel sounds are normal.      Palpations: Abdomen is soft.   Musculoskeletal:         General: No tenderness. Normal range of motion.      Cervical back: Normal range of motion and neck supple.       Right lower leg: No edema.      Left lower leg: No edema.   Skin:     General: Skin is warm and dry.      Capillary Refill: Capillary refill takes less than 2 seconds.      Coloration: Skin is not pale.   Neurological:      General: No focal deficit present.      Mental Status: She is alert and oriented to person, place, and time.      Cranial Nerves: No cranial nerve deficit.      Motor: No weakness.      Gait: Gait normal.   Psychiatric:         Mood and Affect: Mood normal.         Judgment: Judgment normal.         Assessment:       Problem List Items Addressed This Visit        Pulmonary    Mild intermittent asthma without complication (Chronic)    Relevant Medications    montelukast (SINGULAIR) 10 mg tablet       Cardiac/Vascular    Essential hypertension    Relevant Orders    CBC Auto Differential    Comprehensive Metabolic Panel    Lipid Panel       GI    Gastroesophageal reflux disease       Orthopedic    Right hip pain      Other Visit Diagnoses     Follow up    -  Primary    Non-seasonal allergic rhinitis due to pollen        Relevant Medications    montelukast (SINGULAIR) 10 mg tablet          Plan:       1.  The patient presents today for follow up.  HR is in mid 50's on my exam. She is asymptomatic. She still has not gotten covid vaccine but plans to do so. Weight is up a little. We have discussed the importance of aerobic exercise and I have encouraged to start doing some walking. We will check some labs today    2. Asthma-stable with no acute issues.  She is on Advair and ventolin as needed.  She also takes Singulair.  4/21- having to use rescue inhaler more recently. She saw Dr. Ziegler about 1 month ago and he made some changes in her regimen    3. Essential hypertension-blood pressure looks good today.  She is currently on hydralazine, losartan-hydrochlorothiazide 100-12.5, propranolol 120 mg daily.  This is a unique regimen but given her good control we will not make any changes at this time    4/21- bp is well controlled. Continue with current care. HR is 52 but she is asymptomatic. If she starts having any symptoms with her bradycardia we can back down on propranolol. For now will continue with same regimen    4.GERD- stable. Continue with ppi    5. Right hip pain- the patient has had issues with osteoarthritis in the past.  This is clinically suggestive of arthritic pains in the right hip.  We will get an x-ray and refer to Orthopedics.  The patient takes Lyrica for some paresthesias and this should help with her arthritic pains also.  4/21- She states that the robaxin helped along with the mobic. We will continue with this.    She will f/u in 6 months or sooner if needed

## 2022-07-05 RX ORDER — PREGABALIN 50 MG/1
50 CAPSULE ORAL 2 TIMES DAILY
Qty: 180 CAPSULE | Refills: 3 | Status: SHIPPED | OUTPATIENT
Start: 2022-07-05 | End: 2022-10-25 | Stop reason: SDUPTHER

## 2022-09-09 ENCOUNTER — OFFICE VISIT (OUTPATIENT)
Dept: PULMONOLOGY | Facility: CLINIC | Age: 67
End: 2022-09-09
Payer: MEDICARE

## 2022-09-09 VITALS
HEIGHT: 65 IN | WEIGHT: 197 LBS | HEART RATE: 75 BPM | DIASTOLIC BLOOD PRESSURE: 80 MMHG | OXYGEN SATURATION: 97 % | BODY MASS INDEX: 32.82 KG/M2 | RESPIRATION RATE: 16 BRPM | SYSTOLIC BLOOD PRESSURE: 126 MMHG

## 2022-09-09 DIAGNOSIS — J45.20 MILD INTERMITTENT ASTHMA WITHOUT COMPLICATION: Chronic | ICD-10-CM

## 2022-09-09 PROCEDURE — 1126F PR PAIN SEVERITY QUANTIFIED, NO PAIN PRESENT: ICD-10-PCS | Mod: CPTII,,, | Performed by: INTERNAL MEDICINE

## 2022-09-09 PROCEDURE — 3074F SYST BP LT 130 MM HG: CPT | Mod: CPTII,,, | Performed by: INTERNAL MEDICINE

## 2022-09-09 PROCEDURE — 3008F BODY MASS INDEX DOCD: CPT | Mod: CPTII,,, | Performed by: INTERNAL MEDICINE

## 2022-09-09 PROCEDURE — 99213 PR OFFICE/OUTPT VISIT, EST, LEVL III, 20-29 MIN: ICD-10-PCS | Mod: S$PBB,,, | Performed by: INTERNAL MEDICINE

## 2022-09-09 PROCEDURE — 99215 OFFICE O/P EST HI 40 MIN: CPT | Mod: PBBFAC | Performed by: INTERNAL MEDICINE

## 2022-09-09 PROCEDURE — 3288F FALL RISK ASSESSMENT DOCD: CPT | Mod: CPTII,,, | Performed by: INTERNAL MEDICINE

## 2022-09-09 PROCEDURE — 1101F PT FALLS ASSESS-DOCD LE1/YR: CPT | Mod: CPTII,,, | Performed by: INTERNAL MEDICINE

## 2022-09-09 PROCEDURE — 3079F PR MOST RECENT DIASTOLIC BLOOD PRESSURE 80-89 MM HG: ICD-10-PCS | Mod: CPTII,,, | Performed by: INTERNAL MEDICINE

## 2022-09-09 PROCEDURE — 1126F AMNT PAIN NOTED NONE PRSNT: CPT | Mod: CPTII,,, | Performed by: INTERNAL MEDICINE

## 2022-09-09 PROCEDURE — 1159F PR MEDICATION LIST DOCUMENTED IN MEDICAL RECORD: ICD-10-PCS | Mod: CPTII,,, | Performed by: INTERNAL MEDICINE

## 2022-09-09 PROCEDURE — 3288F PR FALLS RISK ASSESSMENT DOCUMENTED: ICD-10-PCS | Mod: CPTII,,, | Performed by: INTERNAL MEDICINE

## 2022-09-09 PROCEDURE — 3079F DIAST BP 80-89 MM HG: CPT | Mod: CPTII,,, | Performed by: INTERNAL MEDICINE

## 2022-09-09 PROCEDURE — 3008F PR BODY MASS INDEX (BMI) DOCUMENTED: ICD-10-PCS | Mod: CPTII,,, | Performed by: INTERNAL MEDICINE

## 2022-09-09 PROCEDURE — 1159F MED LIST DOCD IN RCRD: CPT | Mod: CPTII,,, | Performed by: INTERNAL MEDICINE

## 2022-09-09 PROCEDURE — 1101F PR PT FALLS ASSESS DOC 0-1 FALLS W/OUT INJ PAST YR: ICD-10-PCS | Mod: CPTII,,, | Performed by: INTERNAL MEDICINE

## 2022-09-09 PROCEDURE — 99213 OFFICE O/P EST LOW 20 MIN: CPT | Mod: S$PBB,,, | Performed by: INTERNAL MEDICINE

## 2022-09-09 PROCEDURE — 3074F PR MOST RECENT SYSTOLIC BLOOD PRESSURE < 130 MM HG: ICD-10-PCS | Mod: CPTII,,, | Performed by: INTERNAL MEDICINE

## 2022-09-09 RX ORDER — FLUTICASONE PROPIONATE AND SALMETEROL 500; 50 UG/1; UG/1
1 POWDER RESPIRATORY (INHALATION) 2 TIMES DAILY
Qty: 60 EACH | Refills: 11 | Status: SHIPPED | OUTPATIENT
Start: 2022-09-09 | End: 2022-10-25 | Stop reason: SDUPTHER

## 2022-09-09 NOTE — ASSESSMENT & PLAN NOTE
Patient with intermittent asthma doing much worse when we went down on the Advair dose will increase the backup 500-50 continue Singulair and do reflux precautions will see her back and review how she is doing next visit

## 2022-09-09 NOTE — PROGRESS NOTES
Subjective:       Patient ID: Kash Dumont is a 67 y.o. female.    Chief Complaint: Establish Care (6 month follow up [facility] pt)    Shortness of Breath  This is a chronic problem. The current episode started more than 1 year ago. The problem occurs daily. The problem has been gradually worsening. Pertinent negatives include no abdominal pain, chest pain, ear pain, headaches or rash. The symptoms are aggravated by lying flat. She has tried beta agonist inhalers and steroid inhalers for the symptoms. The treatment provided mild relief.   Past Medical History:   Diagnosis Date    Acute sinusitis     Asthma     Breast cancer     Cough     Dyspnea     History of breast cancer     Hypertension     Pulmonary nodule      Past Surgical History:   Procedure Laterality Date    COLONOSCOPY      HYSTERECTOMY      MASTECTOMY Right     OOPHORECTOMY      TUBAL LIGATION       Family History   Problem Relation Age of Onset    Diabetes Sister     Hypertension Sister     Breast cancer Daughter     Hypertension Daughter      Review of patient's allergies indicates:  No Known Allergies   Social History     Tobacco Use    Smoking status: Never    Smokeless tobacco: Never   Substance Use Topics    Alcohol use: Never    Drug use: Never      Review of Systems   Constitutional:  Negative for chills, activity change and night sweats.   HENT:  Negative for congestion and ear pain.    Eyes:  Negative for redness and itching.   Respiratory:  Positive for shortness of breath.    Cardiovascular:  Negative for chest pain and palpitations.   Musculoskeletal:  Negative for arthralgias and back pain.   Skin:  Negative for rash.   Gastrointestinal:  Negative for abdominal pain and abdominal distention.   Neurological:  Negative for dizziness and headaches.   Hematological:  Negative for adenopathy. Does not bruise/bleed easily.   Psychiatric/Behavioral:  Negative for confusion. The patient is not nervous/anxious.      Objective:      Physical Exam    Constitutional: She is oriented to person, place, and time. She appears well-developed and well-nourished.   HENT:   Head: Normocephalic.   Nose: Nose normal.   Mouth/Throat: Oropharynx is clear and moist.   Neck: No JVD present. No thyromegaly present.   Cardiovascular: Normal rate, regular rhythm, normal heart sounds and intact distal pulses.   Pulmonary/Chest: Normal expansion, hyperinflation, symmetric chest wall expansion, effort normal and breath sounds normal.   Abdominal: Soft. Bowel sounds are normal.   Musculoskeletal:         General: Normal range of motion.      Cervical back: Normal range of motion and neck supple.   Lymphadenopathy: No supraclavicular adenopathy is present.     She has no cervical adenopathy.   Neurological: She is alert and oriented to person, place, and time. She has normal reflexes.   Skin: Skin is warm and dry.   Psychiatric: She has a normal mood and affect. Her behavior is normal.   Personal Diagnostic Review  none pertinent    No flowsheet data found.      Assessment:       1. Mild intermittent asthma without complication          Outpatient Encounter Medications as of 9/9/2022   Medication Sig Dispense Refill    albuterol (PROVENTIL/VENTOLIN HFA) 90 mcg/actuation inhaler INHALE 2 PUFFS BY MOUTH EVERY 4 HOURS AS NEEDED FOR WHEEZING ...SHAKE WELL BEFORE USING... 8.5 g 6    aspirin (ECOTRIN) 81 MG EC tablet Take 81 mg by mouth once daily.      cyclobenzaprine (FLEXERIL) 10 MG tablet Take 1 tablet (10 mg total) by mouth 3 (three) times daily as needed for Muscle spasms. 45 tablet 1    fluticasone propionate (FLONASE) 50 mcg/actuation nasal spray 1 spray (50 mcg total) by Each Nostril route 2 (two) times a day. 16 g 4    fluticasone-salmeterol diskus inhaler 500-50 mcg Inhale 1 puff into the lungs 2 (two) times daily. Controller 60 each 11    hydrALAZINE (APRESOLINE) 10 MG tablet Take 1 tablet (10 mg total) by mouth every 12 (twelve) hours. 180 tablet 3     losartan-hydrochlorothiazide 100-12.5 mg (HYZAAR) 100-12.5 mg Tab Take 1 tablet by mouth once daily. 90 tablet 3    meloxicam (MOBIC) 15 MG tablet Take 1 tablet (15 mg total) by mouth once daily. 90 tablet 3    methocarbamoL (ROBAXIN) 500 MG Tab TAKE 1 TABLET BY MOUTH 4 TIMES DAILY AS NEEDED FOR 10 DAYS *CAUSES DROWSINESS-AVOID ALCOHOL!! 40 tablet 5    montelukast (SINGULAIR) 10 mg tablet Take 1 tablet (10 mg total) by mouth every evening. 90 tablet 3    pregabalin (LYRICA) 50 MG capsule Take 1 capsule (50 mg total) by mouth 2 (two) times daily. 180 capsule 3    propranoloL (INDERAL LA) 120 MG 24 hr capsule Take 1 capsule (120 mg total) by mouth once daily. 90 capsule 3    traMADoL (ULTRAM) 50 mg tablet Take 50 mg by mouth every 6 (six) hours.      zolpidem (AMBIEN) 5 MG Tab Take 5 mg by mouth nightly as needed.      [DISCONTINUED] fluticasone-salmeterol diskus inhaler 250-50 mcg Inhale 1 puff into the lungs 2 (two) times daily. Controller 60 each 11    [DISCONTINUED] methocarbamoL (ROBAXIN) 500 MG Tab TAKE 1 TABLET BY MOUTH 4 TIMES DAILY FOR 10 DAYS *CAUSES DROWSINESS-AVOID ALCOHOL!! 40 tablet 5     No facility-administered encounter medications on file as of 9/9/2022.     No orders of the defined types were placed in this encounter.      Plan:       Problem List Items Addressed This Visit          Pulmonary    Mild intermittent asthma without complication (Chronic)     Patient with intermittent asthma doing much worse when we went down on the Advair dose will increase the backup 500-50 continue Singulair and do reflux precautions will see her back and review how she is doing next visit

## 2022-10-03 ENCOUNTER — HOSPITAL ENCOUNTER (OUTPATIENT)
Dept: RADIOLOGY | Facility: HOSPITAL | Age: 67
Discharge: HOME | End: 2022-10-03
Payer: MEDICARE

## 2022-10-03 DIAGNOSIS — Z12.31 VISIT FOR SCREENING MAMMOGRAM: ICD-10-CM

## 2022-10-03 PROCEDURE — 77067 MAMMO DIGITAL SCREENING LEFT: ICD-10-PCS | Mod: 26,52,, | Performed by: RADIOLOGY

## 2022-10-03 PROCEDURE — 77067 SCR MAMMO BI INCL CAD: CPT | Mod: TC,52

## 2022-10-03 PROCEDURE — 77067 SCR MAMMO BI INCL CAD: CPT | Mod: 26,52,, | Performed by: RADIOLOGY

## 2022-10-25 ENCOUNTER — OFFICE VISIT (OUTPATIENT)
Dept: INTERNAL MEDICINE | Facility: CLINIC | Age: 67
End: 2022-10-25
Payer: MEDICARE

## 2022-10-25 VITALS
OXYGEN SATURATION: 97 % | WEIGHT: 189 LBS | SYSTOLIC BLOOD PRESSURE: 130 MMHG | BODY MASS INDEX: 31.49 KG/M2 | DIASTOLIC BLOOD PRESSURE: 86 MMHG | RESPIRATION RATE: 20 BRPM | HEART RATE: 76 BPM | HEIGHT: 65 IN

## 2022-10-25 DIAGNOSIS — Z09 FOLLOW-UP EXAM: Primary | ICD-10-CM

## 2022-10-25 DIAGNOSIS — Z86.711 HISTORY OF PULMONARY EMBOLUS (PE): ICD-10-CM

## 2022-10-25 DIAGNOSIS — I10 ESSENTIAL HYPERTENSION: ICD-10-CM

## 2022-10-25 DIAGNOSIS — K21.9 GASTROESOPHAGEAL REFLUX DISEASE, UNSPECIFIED WHETHER ESOPHAGITIS PRESENT: ICD-10-CM

## 2022-10-25 DIAGNOSIS — L81.8 IDIOPATHIC GUTTATE HYPOMELANOSIS: ICD-10-CM

## 2022-10-25 DIAGNOSIS — J45.20 MILD INTERMITTENT ASTHMA WITHOUT COMPLICATION: Chronic | ICD-10-CM

## 2022-10-25 DIAGNOSIS — M25.551 RIGHT HIP PAIN: ICD-10-CM

## 2022-10-25 PROCEDURE — 3079F PR MOST RECENT DIASTOLIC BLOOD PRESSURE 80-89 MM HG: ICD-10-PCS | Mod: CPTII,,, | Performed by: INTERNAL MEDICINE

## 2022-10-25 PROCEDURE — 1159F MED LIST DOCD IN RCRD: CPT | Mod: CPTII,,, | Performed by: INTERNAL MEDICINE

## 2022-10-25 PROCEDURE — 99214 PR OFFICE/OUTPT VISIT, EST, LEVL IV, 30-39 MIN: ICD-10-PCS | Mod: S$PBB,,, | Performed by: INTERNAL MEDICINE

## 2022-10-25 PROCEDURE — 1101F PR PT FALLS ASSESS DOC 0-1 FALLS W/OUT INJ PAST YR: ICD-10-PCS | Mod: CPTII,,, | Performed by: INTERNAL MEDICINE

## 2022-10-25 PROCEDURE — 1126F AMNT PAIN NOTED NONE PRSNT: CPT | Mod: CPTII,,, | Performed by: INTERNAL MEDICINE

## 2022-10-25 PROCEDURE — 3075F SYST BP GE 130 - 139MM HG: CPT | Mod: CPTII,,, | Performed by: INTERNAL MEDICINE

## 2022-10-25 PROCEDURE — 1159F PR MEDICATION LIST DOCUMENTED IN MEDICAL RECORD: ICD-10-PCS | Mod: CPTII,,, | Performed by: INTERNAL MEDICINE

## 2022-10-25 PROCEDURE — 3075F PR MOST RECENT SYSTOLIC BLOOD PRESS GE 130-139MM HG: ICD-10-PCS | Mod: CPTII,,, | Performed by: INTERNAL MEDICINE

## 2022-10-25 PROCEDURE — 99214 OFFICE O/P EST MOD 30 MIN: CPT | Mod: S$PBB,,, | Performed by: INTERNAL MEDICINE

## 2022-10-25 PROCEDURE — 3079F DIAST BP 80-89 MM HG: CPT | Mod: CPTII,,, | Performed by: INTERNAL MEDICINE

## 2022-10-25 PROCEDURE — 1126F PR PAIN SEVERITY QUANTIFIED, NO PAIN PRESENT: ICD-10-PCS | Mod: CPTII,,, | Performed by: INTERNAL MEDICINE

## 2022-10-25 PROCEDURE — 1101F PT FALLS ASSESS-DOCD LE1/YR: CPT | Mod: CPTII,,, | Performed by: INTERNAL MEDICINE

## 2022-10-25 PROCEDURE — 3288F PR FALLS RISK ASSESSMENT DOCUMENTED: ICD-10-PCS | Mod: CPTII,,, | Performed by: INTERNAL MEDICINE

## 2022-10-25 PROCEDURE — 99214 OFFICE O/P EST MOD 30 MIN: CPT | Mod: PBBFAC | Performed by: INTERNAL MEDICINE

## 2022-10-25 PROCEDURE — 3288F FALL RISK ASSESSMENT DOCD: CPT | Mod: CPTII,,, | Performed by: INTERNAL MEDICINE

## 2022-10-25 RX ORDER — PREGABALIN 50 MG/1
50 CAPSULE ORAL 2 TIMES DAILY
Qty: 180 CAPSULE | Refills: 3 | Status: SHIPPED | OUTPATIENT
Start: 2022-10-25 | End: 2023-07-18 | Stop reason: SDUPTHER

## 2022-10-25 RX ORDER — PROPRANOLOL HYDROCHLORIDE 120 MG/1
120 CAPSULE, EXTENDED RELEASE ORAL DAILY
Qty: 90 CAPSULE | Refills: 3 | Status: SHIPPED | OUTPATIENT
Start: 2022-10-25 | End: 2023-03-06

## 2022-10-25 RX ORDER — HYDRALAZINE HYDROCHLORIDE 10 MG/1
10 TABLET, FILM COATED ORAL EVERY 12 HOURS
Qty: 180 TABLET | Refills: 3 | Status: SHIPPED | OUTPATIENT
Start: 2022-10-25 | End: 2023-03-07

## 2022-10-25 RX ORDER — CELECOXIB 200 MG/1
200 CAPSULE ORAL DAILY
Qty: 90 CAPSULE | Refills: 1 | Status: SHIPPED | OUTPATIENT
Start: 2022-10-25 | End: 2023-04-25 | Stop reason: SDUPTHER

## 2022-10-25 RX ORDER — ALBUTEROL SULFATE 90 UG/1
AEROSOL, METERED RESPIRATORY (INHALATION)
Qty: 8.5 G | Refills: 6 | Status: SHIPPED | OUTPATIENT
Start: 2022-10-25

## 2022-10-25 RX ORDER — LOSARTAN POTASSIUM AND HYDROCHLOROTHIAZIDE 12.5; 1 MG/1; MG/1
1 TABLET ORAL DAILY
Qty: 90 TABLET | Refills: 3 | Status: SHIPPED | OUTPATIENT
Start: 2022-10-25 | End: 2023-04-25 | Stop reason: SDUPTHER

## 2022-10-25 RX ORDER — FLUTICASONE PROPIONATE AND SALMETEROL 500; 50 UG/1; UG/1
1 POWDER RESPIRATORY (INHALATION) 2 TIMES DAILY
Qty: 60 EACH | Refills: 11 | Status: SHIPPED | OUTPATIENT
Start: 2022-10-25 | End: 2023-04-25 | Stop reason: SDUPTHER

## 2022-10-25 NOTE — PROGRESS NOTES
"Subjective:       Patient ID: Kash Dumont is a 67 y.o. female.    Chief Complaint: Follow-up (6mo)    The patient is a 66-year-old female that presents today to [facility] care.  She has a history of asthma which is well controlled, hypertension, osteoarthritis.  She is she is up-to-date on her health maintenance issues.  She does have a history of breast cancer in 2014. She had a right mastectomy.  She is in remission.  She has not received COVID vaccine.  We have discussed the pros and cons and I have encouraged her to get the vaccine.  Her main complaint today is of right hip pain which has been ongoing for several months.  She has a history of osteoarthritis and had her knee replaced in 2015. She denies any weakness.  Today she is resting comfortably in no distress.  She is afebrile and vital signs are stable.     4/21-The patient presents today for follow up. No significant changes in health since we last saw her. She still has not gotten the covid vaccine but she states that she plans to do that. She does admit to gaining a little weight but she has mostly been staying at home. We have discussed getting outside and doing some walking. She needs some refills. Hip pain is better. She has been using her rescue inhaler daily. Recently saw Dr. Ziegler and he made some changes in her regimen. Today she is resting comfortably in no distress. She is afebrile and vital signs are stable.    10/25-  The patient presents today for follow up. She saw Dr. Olson last month and had her advair increased. She states that her breathing is doing good. She had to stop her mobic due to some GI issues. Mammogram was done on 10/3. She is resting comfortably today in no distress. She is afebrile and vital signs are stable. She has some "spots" on her arms and legs that she would like for me to take a look at. They are small white spots that looks like loss of pigmentation. They do not itch. She is not sure how long they have been " there.     Follow-up  Associated symptoms include arthralgias. Pertinent negatives include no abdominal pain, chest pain, chills, congestion, coughing, fatigue, fever, headaches, joint swelling, myalgias, nausea, neck pain, rash, sore throat or weakness.   Review of Systems   Constitutional:  Negative for appetite change, chills, fatigue and fever.   HENT:  Negative for nasal congestion, ear pain, hearing loss, sinus pressure/congestion and sore throat.    Eyes:  Negative for pain, redness and visual disturbance.   Respiratory:  Positive for wheezing. Negative for apnea, cough and shortness of breath.    Cardiovascular:  Negative for chest pain and palpitations.   Gastrointestinal:  Negative for abdominal pain, constipation, diarrhea and nausea.   Endocrine: Negative for cold intolerance, heat intolerance and polyuria.   Genitourinary:  Negative for dysuria and hematuria.   Musculoskeletal:  Positive for arthralgias. Negative for back pain, joint swelling, myalgias and neck pain.   Integumentary:  Positive for color change. Negative for pallor, rash and wound.   Allergic/Immunologic: Negative for immunocompromised state.   Neurological:  Negative for tremors, seizures, weakness, headaches and memory loss.   Hematological:  Negative for adenopathy.   Psychiatric/Behavioral:  Negative for confusion, dysphoric mood and sleep disturbance. The patient is not nervous/anxious.        Objective:      Physical Exam  Vitals and nursing note reviewed.   Constitutional:       General: She is not in acute distress.     Appearance: Normal appearance. She is obese. She is not ill-appearing.   HENT:      Head: Normocephalic and atraumatic.      Right Ear: External ear normal.      Left Ear: External ear normal.      Nose: Nose normal.      Mouth/Throat:      Pharynx: Oropharynx is clear.   Eyes:      Extraocular Movements: Extraocular movements intact.      Conjunctiva/sclera: Conjunctivae normal.      Pupils: Pupils are equal,  round, and reactive to light.   Neck:      Vascular: No carotid bruit.   Cardiovascular:      Rate and Rhythm: Normal rate and regular rhythm.      Pulses: Normal pulses.      Heart sounds: Normal heart sounds. No murmur heard.  Pulmonary:      Effort: No respiratory distress.      Breath sounds: Normal breath sounds. No wheezing or rales.   Abdominal:      General: Bowel sounds are normal.      Palpations: Abdomen is soft.   Musculoskeletal:         General: No tenderness. Normal range of motion.      Cervical back: Normal range of motion and neck supple.      Right lower leg: No edema.      Left lower leg: No edema.   Skin:     General: Skin is warm and dry.      Capillary Refill: Capillary refill takes less than 2 seconds.      Coloration: Skin is not pale.      Comments: Pinpoint white spots on extensor surfaces of arms and legs. Non-palpable   Neurological:      General: No focal deficit present.      Mental Status: She is alert and oriented to person, place, and time.      Cranial Nerves: No cranial nerve deficit.      Motor: No weakness.      Gait: Gait normal.   Psychiatric:         Mood and Affect: Mood normal.         Judgment: Judgment normal.       Assessment:       Problem List Items Addressed This Visit          Derm    Idiopathic guttate hypomelanosis       Pulmonary    Mild intermittent asthma without complication (Chronic)    Relevant Medications    albuterol (PROVENTIL/VENTOLIN HFA) 90 mcg/actuation inhaler       Cardiac/Vascular    Essential hypertension    Relevant Orders    CBC Auto Differential    Comprehensive Metabolic Panel    Lipid Panel       Hematology    History of pulmonary embolus (PE)       GI    Gastroesophageal reflux disease       Orthopedic    Right hip pain     Other Visit Diagnoses       Follow-up exam    -  Primary            Plan:       1.  The patient presents today for follow up.  We have reviewed lab work. Creatinine is 0.8. Lipid panel with TC-269, HDL 90, . Based  on current statin guidelines a moderate intensity statin is recommended. I will discuss this with her.    2. Asthma-stable with no acute issues.  She is on Advair and ventolin as needed.  She also takes Singulair.  4/21- having to use rescue inhaler more recently. She saw Dr. Ziegler about 1 month ago and he made some changes in her regimen  10/25- Doing well. She saw Dr. Olson in September. Advair increased to 500-50    3. Essential hypertension-blood pressure looks good today.  She is currently on hydralazine, losartan-hydrochlorothiazide 100-12.5, propranolol 120 mg daily.  This is a unique regimen but given her good control we will not make any changes at this time   4/21- bp is well controlled. Continue with current care. HR is 52 but she is asymptomatic. If she starts having any symptoms with her bradycardia we can back down on propranolol. For now will continue with same regimen  10/25- /86. HR is 76. Will continue with current care    4.GERD- stable. Continue with ppi    5. Right hip pain- the patient has had issues with osteoarthritis in the past.  This is clinically suggestive of arthritic pains in the right hip.  We will get an x-ray and refer to Orthopedics.  The patient takes Lyrica for some paresthesias and this should help with her arthritic pains also.  4/21- She states that the robaxin helped along with the mobic. We will continue with this.  10/25- doing better    6. Skin lesion- small pinpoint lesions on extensor surfaces of arms and legs. Looks like guttate hypomelanosis. Can be treated with cryotherapy, Topical retinoids or steroids. Currently not causing any issues. Can refer to dermatology if needed    She will f/u in 6 months or sooner if needed

## 2023-03-10 ENCOUNTER — OFFICE VISIT (OUTPATIENT)
Dept: PULMONOLOGY | Facility: CLINIC | Age: 68
End: 2023-03-10
Payer: MEDICARE

## 2023-03-10 VITALS
OXYGEN SATURATION: 98 % | DIASTOLIC BLOOD PRESSURE: 81 MMHG | HEART RATE: 68 BPM | RESPIRATION RATE: 16 BRPM | WEIGHT: 193 LBS | SYSTOLIC BLOOD PRESSURE: 148 MMHG | BODY MASS INDEX: 32.15 KG/M2 | HEIGHT: 65 IN

## 2023-03-10 DIAGNOSIS — J45.20 MILD INTERMITTENT ASTHMA WITHOUT COMPLICATION: Chronic | ICD-10-CM

## 2023-03-10 PROCEDURE — 3079F DIAST BP 80-89 MM HG: CPT | Mod: CPTII,,, | Performed by: INTERNAL MEDICINE

## 2023-03-10 PROCEDURE — 3008F BODY MASS INDEX DOCD: CPT | Mod: CPTII,,, | Performed by: INTERNAL MEDICINE

## 2023-03-10 PROCEDURE — 1126F AMNT PAIN NOTED NONE PRSNT: CPT | Mod: CPTII,,, | Performed by: INTERNAL MEDICINE

## 2023-03-10 PROCEDURE — 3008F PR BODY MASS INDEX (BMI) DOCUMENTED: ICD-10-PCS | Mod: CPTII,,, | Performed by: INTERNAL MEDICINE

## 2023-03-10 PROCEDURE — 3288F PR FALLS RISK ASSESSMENT DOCUMENTED: ICD-10-PCS | Mod: CPTII,,, | Performed by: INTERNAL MEDICINE

## 2023-03-10 PROCEDURE — 3288F FALL RISK ASSESSMENT DOCD: CPT | Mod: CPTII,,, | Performed by: INTERNAL MEDICINE

## 2023-03-10 PROCEDURE — 99214 OFFICE O/P EST MOD 30 MIN: CPT | Mod: PBBFAC | Performed by: INTERNAL MEDICINE

## 2023-03-10 PROCEDURE — 3079F PR MOST RECENT DIASTOLIC BLOOD PRESSURE 80-89 MM HG: ICD-10-PCS | Mod: CPTII,,, | Performed by: INTERNAL MEDICINE

## 2023-03-10 PROCEDURE — 3077F PR MOST RECENT SYSTOLIC BLOOD PRESSURE >= 140 MM HG: ICD-10-PCS | Mod: CPTII,,, | Performed by: INTERNAL MEDICINE

## 2023-03-10 PROCEDURE — 99213 OFFICE O/P EST LOW 20 MIN: CPT | Mod: S$PBB,,, | Performed by: INTERNAL MEDICINE

## 2023-03-10 PROCEDURE — 1126F PR PAIN SEVERITY QUANTIFIED, NO PAIN PRESENT: ICD-10-PCS | Mod: CPTII,,, | Performed by: INTERNAL MEDICINE

## 2023-03-10 PROCEDURE — 1101F PT FALLS ASSESS-DOCD LE1/YR: CPT | Mod: CPTII,,, | Performed by: INTERNAL MEDICINE

## 2023-03-10 PROCEDURE — 1101F PR PT FALLS ASSESS DOC 0-1 FALLS W/OUT INJ PAST YR: ICD-10-PCS | Mod: CPTII,,, | Performed by: INTERNAL MEDICINE

## 2023-03-10 PROCEDURE — 99213 PR OFFICE/OUTPT VISIT, EST, LEVL III, 20-29 MIN: ICD-10-PCS | Mod: S$PBB,,, | Performed by: INTERNAL MEDICINE

## 2023-03-10 PROCEDURE — 3077F SYST BP >= 140 MM HG: CPT | Mod: CPTII,,, | Performed by: INTERNAL MEDICINE

## 2023-03-10 PROCEDURE — 1159F PR MEDICATION LIST DOCUMENTED IN MEDICAL RECORD: ICD-10-PCS | Mod: CPTII,,, | Performed by: INTERNAL MEDICINE

## 2023-03-10 PROCEDURE — 1159F MED LIST DOCD IN RCRD: CPT | Mod: CPTII,,, | Performed by: INTERNAL MEDICINE

## 2023-03-10 RX ORDER — MELOXICAM 15 MG/1
TABLET ORAL
COMMUNITY
Start: 2023-03-06 | End: 2023-04-25

## 2023-03-10 RX ORDER — MONTELUKAST SODIUM 10 MG/1
10 TABLET ORAL
COMMUNITY
Start: 2023-03-02 | End: 2023-04-25 | Stop reason: SDUPTHER

## 2023-03-10 RX ORDER — CYCLOSPORINE 0.5 MG/ML
EMULSION OPHTHALMIC
COMMUNITY
Start: 2022-12-13

## 2023-03-10 NOTE — PROGRESS NOTES
"Subjective:       Patient ID: Kash Dumont is a 67 y.o. female.    Chief Complaint: Asthma (Six month follow-up.  Hx Asthma, bronchitis.  Recent c/o HS throat, airway closign": using Albuterol more often with pollen season.  Reports some SOB episodes daytimes)    Asthma  She complains of shortness of breath. This is a recurrent problem. The problem has been unchanged. Pertinent negatives include no chest pain, ear pain or headaches. Her past medical history is significant for asthma.   Past Medical History:   Diagnosis Date    Acute sinusitis     Asthma     Breast cancer     Cough     Dyspnea     History of breast cancer     Hypertension     Pulmonary nodule      Past Surgical History:   Procedure Laterality Date    COLONOSCOPY      HYSTERECTOMY      MASTECTOMY Right     OOPHORECTOMY      TUBAL LIGATION       Family History   Problem Relation Age of Onset    Diabetes Sister     Hypertension Sister     Breast cancer Daughter     Hypertension Daughter      Review of patient's allergies indicates:  No Known Allergies   Social History     Tobacco Use    Smoking status: Never    Smokeless tobacco: Never   Substance Use Topics    Alcohol use: Never    Drug use: Never      Review of Systems   Constitutional:  Negative for chills, activity change and night sweats.   HENT:  Negative for congestion and ear pain.    Eyes:  Negative for redness and itching.   Respiratory:  Positive for shortness of breath.    Cardiovascular:  Negative for chest pain and palpitations.   Musculoskeletal:  Negative for arthralgias and back pain.   Skin:  Negative for rash.   Gastrointestinal:  Negative for abdominal pain and abdominal distention.   Neurological:  Negative for dizziness and headaches.   Hematological:  Negative for adenopathy. Does not bruise/bleed easily.   Psychiatric/Behavioral:  Negative for confusion. The patient is not nervous/anxious.      Objective:      Physical Exam   Constitutional: She is oriented to person, place, " and time. She appears well-developed and well-nourished.   HENT:   Head: Normocephalic.   Nose: Nose normal.   Mouth/Throat: Oropharynx is clear and moist.   Neck: No JVD present. No thyromegaly present.   Cardiovascular: Normal rate, regular rhythm, normal heart sounds and intact distal pulses.   Pulmonary/Chest: Normal expansion, hyperinflation, symmetric chest wall expansion, effort normal and breath sounds normal.   Abdominal: Soft. Bowel sounds are normal.   Musculoskeletal:         General: Normal range of motion.      Cervical back: Normal range of motion and neck supple.   Lymphadenopathy: No supraclavicular adenopathy is present.     She has no cervical adenopathy.   Neurological: She is alert and oriented to person, place, and time. She has normal reflexes.   Skin: Skin is warm and dry.   Psychiatric: She has a normal mood and affect. Her behavior is normal.   Personal Diagnostic Review  none pertinent    No flowsheet data found.      Assessment:       1. Mild intermittent asthma without complication          Outpatient Encounter Medications as of 3/10/2023   Medication Sig Dispense Refill    albuterol (PROVENTIL/VENTOLIN HFA) 90 mcg/actuation inhaler INHALE 2 PUFFS BY MOUTH EVERY 4 HOURS AS NEEDED FOR WHEEZING ...SHAKE WELL BEFORE USING... Strength: 90 mcg/actuation 8.5 g 6    celecoxib (CELEBREX) 200 MG capsule Take 1 capsule (200 mg total) by mouth once daily. 90 capsule 1    fluticasone-salmeterol diskus inhaler 500-50 mcg Inhale 1 puff into the lungs 2 (two) times daily. Controller 60 each 11    hydrALAZINE (APRESOLINE) 10 MG tablet TAKE 1 TABLET BY MOUTH EVERY 12 HOURS 180 tablet 3    losartan-hydrochlorothiazide 100-12.5 mg (HYZAAR) 100-12.5 mg Tab Take 1 tablet by mouth once daily. 90 tablet 3    meloxicam (MOBIC) 15 MG tablet TAKE 1 TABLET BY MOUTH EVERY DAY AS NEEDED ..DON TABLET TAKE ASPIRIN OR NSAIDS OR GINKO WITH FOOD      montelukast (SINGULAIR) 10 mg tablet Take 10 mg by mouth.       pregabalin (LYRICA) 50 MG capsule Take 1 capsule (50 mg total) by mouth 2 (two) times daily. 180 capsule 3    propranoloL (INDERAL LA) 120 MG 24 hr capsule TAKE 1 CAPSULE BY MOUTH EVERY DAY 90 capsule 3    RESTASIS 0.05 % ophthalmic emulsion INSTILL 1 DROP INTO AFFECTED EYE(S) EVERY 12 HOURS      traMADoL (ULTRAM) 50 mg tablet Take 50 mg by mouth every 6 (six) hours.      zolpidem (AMBIEN) 5 MG Tab Take 5 mg by mouth nightly as needed.      fluticasone propionate (FLONASE) 50 mcg/actuation nasal spray 1 spray (50 mcg total) by Each Nostril route 2 (two) times a day. (Patient not taking: Reported on 3/10/2023) 16 g 4    methocarbamoL (ROBAXIN) 500 MG Tab TAKE 1 TABLET BY MOUTH 4 TIMES DAILY AS NEEDED FOR 10 DAYS *CAUSES DROWSINESS-AVOID ALCOHOL!! (Patient not taking: Reported on 3/10/2023) 40 tablet 5     No facility-administered encounter medications on file as of 3/10/2023.     No orders of the defined types were placed in this encounter.      Plan:       Problem List Items Addressed This Visit          Pulmonary    Mild intermittent asthma without complication (Chronic)     Doing well current therapy no new issues will see back in the clinic in 1 year continue on Advair and p.r.n. Ventolin increase activity lose weight

## 2023-03-10 NOTE — ASSESSMENT & PLAN NOTE
Doing well current therapy no new issues will see back in the clinic in 1 year continue on Advair and p.r.n. Ventolin increase activity lose weight

## 2023-04-25 ENCOUNTER — OFFICE VISIT (OUTPATIENT)
Dept: INTERNAL MEDICINE | Facility: CLINIC | Age: 68
End: 2023-04-25
Payer: MEDICARE

## 2023-04-25 VITALS
OXYGEN SATURATION: 99 % | BODY MASS INDEX: 32.32 KG/M2 | RESPIRATION RATE: 18 BRPM | HEART RATE: 66 BPM | HEIGHT: 65 IN | SYSTOLIC BLOOD PRESSURE: 136 MMHG | DIASTOLIC BLOOD PRESSURE: 78 MMHG | TEMPERATURE: 97 F | WEIGHT: 194 LBS

## 2023-04-25 DIAGNOSIS — Z13.1 ENCOUNTER FOR SCREENING FOR DIABETES MELLITUS: ICD-10-CM

## 2023-04-25 DIAGNOSIS — M25.531 BILATERAL WRIST PAIN: ICD-10-CM

## 2023-04-25 DIAGNOSIS — M25.532 BILATERAL WRIST PAIN: ICD-10-CM

## 2023-04-25 DIAGNOSIS — K21.9 GASTROESOPHAGEAL REFLUX DISEASE, UNSPECIFIED WHETHER ESOPHAGITIS PRESENT: ICD-10-CM

## 2023-04-25 DIAGNOSIS — E78.5 DYSLIPIDEMIA: ICD-10-CM

## 2023-04-25 DIAGNOSIS — Z78.0 ASYMPTOMATIC MENOPAUSAL STATE: ICD-10-CM

## 2023-04-25 DIAGNOSIS — I10 ESSENTIAL HYPERTENSION: ICD-10-CM

## 2023-04-25 DIAGNOSIS — Z86.711 HISTORY OF PULMONARY EMBOLUS (PE): ICD-10-CM

## 2023-04-25 DIAGNOSIS — Z13.820 OSTEOPOROSIS SCREENING: ICD-10-CM

## 2023-04-25 DIAGNOSIS — J45.20 MILD INTERMITTENT ASTHMA WITHOUT COMPLICATION: Chronic | ICD-10-CM

## 2023-04-25 DIAGNOSIS — Z09 FOLLOW-UP EXAM: Primary | ICD-10-CM

## 2023-04-25 PROCEDURE — 1159F MED LIST DOCD IN RCRD: CPT | Mod: CPTII,,, | Performed by: INTERNAL MEDICINE

## 2023-04-25 PROCEDURE — 99214 OFFICE O/P EST MOD 30 MIN: CPT | Mod: S$PBB,,, | Performed by: INTERNAL MEDICINE

## 2023-04-25 PROCEDURE — 3008F PR BODY MASS INDEX (BMI) DOCUMENTED: ICD-10-PCS | Mod: CPTII,,, | Performed by: INTERNAL MEDICINE

## 2023-04-25 PROCEDURE — 3288F PR FALLS RISK ASSESSMENT DOCUMENTED: ICD-10-PCS | Mod: CPTII,,, | Performed by: INTERNAL MEDICINE

## 2023-04-25 PROCEDURE — 1101F PR PT FALLS ASSESS DOC 0-1 FALLS W/OUT INJ PAST YR: ICD-10-PCS | Mod: CPTII,,, | Performed by: INTERNAL MEDICINE

## 2023-04-25 PROCEDURE — 3075F SYST BP GE 130 - 139MM HG: CPT | Mod: CPTII,,, | Performed by: INTERNAL MEDICINE

## 2023-04-25 PROCEDURE — 3078F DIAST BP <80 MM HG: CPT | Mod: CPTII,,, | Performed by: INTERNAL MEDICINE

## 2023-04-25 PROCEDURE — 3008F BODY MASS INDEX DOCD: CPT | Mod: CPTII,,, | Performed by: INTERNAL MEDICINE

## 2023-04-25 PROCEDURE — 3288F FALL RISK ASSESSMENT DOCD: CPT | Mod: CPTII,,, | Performed by: INTERNAL MEDICINE

## 2023-04-25 PROCEDURE — 1126F AMNT PAIN NOTED NONE PRSNT: CPT | Mod: CPTII,,, | Performed by: INTERNAL MEDICINE

## 2023-04-25 PROCEDURE — 1159F PR MEDICATION LIST DOCUMENTED IN MEDICAL RECORD: ICD-10-PCS | Mod: CPTII,,, | Performed by: INTERNAL MEDICINE

## 2023-04-25 PROCEDURE — 3075F PR MOST RECENT SYSTOLIC BLOOD PRESS GE 130-139MM HG: ICD-10-PCS | Mod: CPTII,,, | Performed by: INTERNAL MEDICINE

## 2023-04-25 PROCEDURE — 99214 PR OFFICE/OUTPT VISIT, EST, LEVL IV, 30-39 MIN: ICD-10-PCS | Mod: S$PBB,,, | Performed by: INTERNAL MEDICINE

## 2023-04-25 PROCEDURE — 1126F PR PAIN SEVERITY QUANTIFIED, NO PAIN PRESENT: ICD-10-PCS | Mod: CPTII,,, | Performed by: INTERNAL MEDICINE

## 2023-04-25 PROCEDURE — 99215 OFFICE O/P EST HI 40 MIN: CPT | Mod: PBBFAC | Performed by: INTERNAL MEDICINE

## 2023-04-25 PROCEDURE — 3078F PR MOST RECENT DIASTOLIC BLOOD PRESSURE < 80 MM HG: ICD-10-PCS | Mod: CPTII,,, | Performed by: INTERNAL MEDICINE

## 2023-04-25 PROCEDURE — 1101F PT FALLS ASSESS-DOCD LE1/YR: CPT | Mod: CPTII,,, | Performed by: INTERNAL MEDICINE

## 2023-04-25 RX ORDER — DICLOFENAC SODIUM 10 MG/G
2 GEL TOPICAL 2 TIMES DAILY
Qty: 20 G | Refills: 1 | Status: SHIPPED | OUTPATIENT
Start: 2023-04-25 | End: 2024-03-11

## 2023-04-25 RX ORDER — FLUTICASONE PROPIONATE AND SALMETEROL 500; 50 UG/1; UG/1
1 POWDER RESPIRATORY (INHALATION) 2 TIMES DAILY
Qty: 60 EACH | Refills: 11 | Status: SHIPPED | OUTPATIENT
Start: 2023-04-25 | End: 2023-10-10 | Stop reason: SDUPTHER

## 2023-04-25 RX ORDER — PROPRANOLOL HYDROCHLORIDE 120 MG/1
120 CAPSULE, EXTENDED RELEASE ORAL DAILY
Qty: 90 CAPSULE | Refills: 3 | Status: SHIPPED | OUTPATIENT
Start: 2023-04-25

## 2023-04-25 RX ORDER — MONTELUKAST SODIUM 10 MG/1
10 TABLET ORAL DAILY
Qty: 90 TABLET | Refills: 3 | Status: SHIPPED | OUTPATIENT
Start: 2023-04-25

## 2023-04-25 RX ORDER — ATORVASTATIN CALCIUM 10 MG/1
10 TABLET, FILM COATED ORAL DAILY
Qty: 90 TABLET | Refills: 3 | Status: SHIPPED | OUTPATIENT
Start: 2023-04-25 | End: 2024-01-30

## 2023-04-25 RX ORDER — CELECOXIB 200 MG/1
200 CAPSULE ORAL DAILY
Qty: 90 CAPSULE | Refills: 1 | Status: SHIPPED | OUTPATIENT
Start: 2023-04-25 | End: 2023-10-23 | Stop reason: SDUPTHER

## 2023-04-25 RX ORDER — HYDRALAZINE HYDROCHLORIDE 10 MG/1
10 TABLET, FILM COATED ORAL EVERY 12 HOURS
Qty: 180 TABLET | Refills: 3 | Status: SHIPPED | OUTPATIENT
Start: 2023-04-25

## 2023-04-25 RX ORDER — MELOXICAM 15 MG/1
TABLET ORAL
Status: CANCELLED | OUTPATIENT
Start: 2023-04-25

## 2023-04-25 RX ORDER — LOSARTAN POTASSIUM AND HYDROCHLOROTHIAZIDE 12.5; 1 MG/1; MG/1
1 TABLET ORAL DAILY
Qty: 90 TABLET | Refills: 3 | Status: SHIPPED | OUTPATIENT
Start: 2023-04-25

## 2023-04-25 NOTE — PROGRESS NOTES
Subjective:       Patient ID: Kash Dumont is a 67 y.o. female.    Chief Complaint: Follow-up    The patient is a 66-year-old female that presents today to establish care.  She has a history of asthma which is well controlled, hypertension, osteoarthritis.  She is she is up-to-date on her health maintenance issues.  She does have a history of breast cancer in 2014. She had a right mastectomy.  She is in remission.  She has not received COVID vaccine.  We have discussed the pros and cons and I have encouraged her to get the vaccine.  Her main complaint today is of right hip pain which has been ongoing for several months.  She has a history of osteoarthritis and had her knee replaced in 2015. She denies any weakness.  Today she is resting comfortably in no distress.  She is afebrile and vital signs are stable.     4/24/23-Ms. Dumont presents today for follow-up.  No significant changes in her health since we last saw her.  She had some lab work done back in March which we have reviewed.  Her blood pressure looks good today it is 130/78.  We do need to set her up for a DEXA bone scan.  She states that her breathing is better.  Her right hip pain has resolved with no further issues.  She does have some bilateral wrist pain.  It gets worse at night.  She denies any numbness or tingling.  Today she is resting comfortably in no distress.  She is afebrile and vital signs are stable.    Follow-up  Associated symptoms include arthralgias. Pertinent negatives include no abdominal pain, chest pain, chills, congestion, coughing, fatigue, fever, headaches, joint swelling, myalgias, nausea, neck pain, rash, sore throat or weakness.   Review of Systems   Constitutional:  Negative for appetite change, chills, fatigue and fever.   HENT:  Negative for nasal congestion, ear pain, hearing loss, sinus pressure/congestion and sore throat.    Eyes:  Negative for pain, redness and visual disturbance.   Respiratory:  Negative for apnea,  cough, shortness of breath and wheezing.    Cardiovascular:  Negative for chest pain and palpitations.   Gastrointestinal:  Negative for abdominal pain, constipation, diarrhea and nausea.   Endocrine: Negative for cold intolerance, heat intolerance and polyuria.   Genitourinary:  Negative for dysuria and hematuria.   Musculoskeletal:  Positive for arthralgias. Negative for back pain, joint swelling, myalgias and neck pain.   Integumentary:  Negative for color change, pallor, rash and wound.   Allergic/Immunologic: Negative for immunocompromised state.   Neurological:  Negative for tremors, seizures, weakness, headaches and memory loss.   Hematological:  Negative for adenopathy.   Psychiatric/Behavioral:  Negative for confusion, dysphoric mood and sleep disturbance. The patient is not nervous/anxious.        Objective:      Physical Exam  Vitals and nursing note reviewed.   Constitutional:       General: She is not in acute distress.     Appearance: Normal appearance. She is obese. She is not ill-appearing.   HENT:      Head: Normocephalic and atraumatic.      Right Ear: External ear normal.      Left Ear: External ear normal.      Nose: Nose normal.      Mouth/Throat:      Pharynx: Oropharynx is clear.   Eyes:      Extraocular Movements: Extraocular movements intact.      Conjunctiva/sclera: Conjunctivae normal.      Pupils: Pupils are equal, round, and reactive to light.   Neck:      Vascular: No carotid bruit.   Cardiovascular:      Rate and Rhythm: Normal rate and regular rhythm.      Pulses: Normal pulses.      Heart sounds: Normal heart sounds. No murmur heard.  Pulmonary:      Effort: No respiratory distress.      Breath sounds: Normal breath sounds. No wheezing or rales.   Abdominal:      General: Bowel sounds are normal.      Palpations: Abdomen is soft.   Musculoskeletal:         General: Tenderness present. Normal range of motion.      Cervical back: Normal range of motion and neck supple.      Right lower  leg: No edema.      Left lower leg: No edema.   Skin:     General: Skin is warm and dry.      Capillary Refill: Capillary refill takes less than 2 seconds.      Coloration: Skin is not pale.   Neurological:      General: No focal deficit present.      Mental Status: She is alert and oriented to person, place, and time.      Cranial Nerves: No cranial nerve deficit.      Motor: No weakness.      Gait: Gait normal.   Psychiatric:         Mood and Affect: Mood normal.         Judgment: Judgment normal.       Assessment:       Problem List Items Addressed This Visit          Pulmonary    Mild intermittent asthma without complication (Chronic)       Cardiac/Vascular    Essential hypertension    Dyslipidemia       Hematology    History of pulmonary embolus (PE)       GI    Gastroesophageal reflux disease       Orthopedic    Bilateral wrist pain     Other Visit Diagnoses       Follow-up exam    -  Primary    Encounter for screening for diabetes mellitus        Osteoporosis screening        Relevant Orders    DXA Bone Density Axial Skeleton 1 or more sites    Asymptomatic menopausal state        Relevant Orders    DXA Bone Density Axial Skeleton 1 or more sites            Plan:       1.  The patient presents today for follow up.  We have reviewed lab work.  This was done in March.  Glucose is 88, creatinine 0.76.  H&H 13.6 and 42.4.  We are going to set her up for a DEXA scan today    2. Asthma-stable with no acute issues.  She is on Advair and ventolin as needed.  She also takes Singulair.  4/21- having to use rescue inhaler more recently. She saw Dr. Ziegler about 1 month ago and he made some changes in her regimen  10/25- Doing well. She saw Dr. Olson in September. Advair increased to 500-50  4/25/23-no acute issues.  Her respiratory status is stable    3. Essential hypertension-blood pressure looks good today.  She is currently on hydralazine, losartan-hydrochlorothiazide 100-12.5, propranolol 120 mg daily.  This is a  unique regimen but given her good control we will not make any changes at this time   4/21- bp is well controlled. Continue with current care. HR is 52 but she is asymptomatic. If she starts having any symptoms with her bradycardia we can back down on propranolol. For now will continue with same regimen  10/25- /86. HR is 76. Will continue with current care  4/25/23--blood pressure 130/78 today.  Continue with current care.  Heart rate is 66    4.GERD- stable. Continue with ppi    5. Osteoarthritis- the patient has had issues with osteoarthritis in the past.  This is clinically suggestive of arthritic pains in the right hip.  We will get an x-ray and refer to Orthopedics.  The patient takes Lyrica for some paresthesias and this should help with her arthritic pains also.  4/21- She states that the robaxin helped along with the mobic. We will continue with this.  10/25- doing better  4/25/23-hip pain is better.  She has bilateral wrist pain now which is likely secondary to osteoarthritis versus carpal tunnel.  We have discussed using wrist splints at bedtime.  Also Voltaren gel p.r.n..  Continue with Celebrex    6. Skin lesion- small pinpoint lesions on extensor surfaces of arms and legs. Looks like guttate hypomelanosis. Can be treated with cryotherapy, Topical retinoids or steroids. Currently not causing any issues. Can refer to dermatology if needed  4/25/23-no acute issues    7. Dyslipidemia-recent lipid panel done on March 10, 2023 with total cholesterol of 258, HDL 84 and .  Based on current statin guidelines a moderate intensity statin is recommended.  I have discussed this with the patient and she is agreed to proceed.  We will start atorvastatin 10 mg daily    She will f/u in 6 months or sooner if needed

## 2023-05-09 DIAGNOSIS — Z71.89 COMPLEX CARE COORDINATION: ICD-10-CM

## 2023-07-18 NOTE — TELEPHONE ENCOUNTER
----- Message from Malini Xiong sent at 7/18/2023 11:56 AM CDT -----  Patient need you to give her a call, no details was given.

## 2023-07-18 NOTE — TELEPHONE ENCOUNTER
Pt asking for refill of lyrica. [facility] Franchesca Cabrera NP sending in medication for pt due to Dr. Napoles being out of office.

## 2023-07-18 NOTE — TELEPHONE ENCOUNTER
I have called pt to let her know that NP is working on getting it set up so she can send in medication. Pt thanked me for letting her know

## 2023-07-19 RX ORDER — PREGABALIN 50 MG/1
50 CAPSULE ORAL 2 TIMES DAILY
Qty: 60 CAPSULE | Refills: 0 | Status: SHIPPED | OUTPATIENT
Start: 2023-07-19 | End: 2023-08-28 | Stop reason: SDUPTHER

## 2023-08-28 RX ORDER — PREGABALIN 50 MG/1
50 CAPSULE ORAL 2 TIMES DAILY
Qty: 60 CAPSULE | Refills: 1 | Status: SHIPPED | OUTPATIENT
Start: 2023-08-28 | End: 2023-11-06 | Stop reason: SDUPTHER

## 2023-08-28 NOTE — TELEPHONE ENCOUNTER
----- Message from Tila Ivory sent at 8/25/2023  1:18 PM CDT -----  Requesting refill for Pgregabalin 50 mg.  Call back number is [phone]

## 2023-08-28 NOTE — TELEPHONE ENCOUNTER
Called pt to see which pharmacy she would like for medication to be sent to. Pt asking for medication to be sent to [facility].

## 2023-10-09 ENCOUNTER — HOSPITAL ENCOUNTER (OUTPATIENT)
Dept: RADIOLOGY | Facility: HOSPITAL | Age: 68
Discharge: HOME | End: 2023-10-09
Payer: MEDICARE

## 2023-10-09 DIAGNOSIS — Z12.31 VISIT FOR SCREENING MAMMOGRAM: ICD-10-CM

## 2023-10-09 PROCEDURE — 77067 MAMMO DIGITAL SCREENING LEFT: ICD-10-PCS | Mod: 26,52,, | Performed by: RADIOLOGY

## 2023-10-09 PROCEDURE — 77067 SCR MAMMO BI INCL CAD: CPT | Mod: 26,52,, | Performed by: RADIOLOGY

## 2023-10-09 PROCEDURE — 77067 SCR MAMMO BI INCL CAD: CPT | Mod: TC,52

## 2023-10-10 ENCOUNTER — OFFICE VISIT (OUTPATIENT)
Dept: INTERNAL MEDICINE | Facility: CLINIC | Age: 68
End: 2023-10-10
Payer: MEDICARE

## 2023-10-10 VITALS
TEMPERATURE: 97 F | WEIGHT: 201 LBS | BODY MASS INDEX: 33.49 KG/M2 | HEIGHT: 65 IN | HEART RATE: 81 BPM | RESPIRATION RATE: 16 BRPM | DIASTOLIC BLOOD PRESSURE: 72 MMHG | OXYGEN SATURATION: 96 % | SYSTOLIC BLOOD PRESSURE: 134 MMHG

## 2023-10-10 DIAGNOSIS — Z86.711 HISTORY OF PULMONARY EMBOLUS (PE): ICD-10-CM

## 2023-10-10 DIAGNOSIS — L81.8 IDIOPATHIC GUTTATE HYPOMELANOSIS: ICD-10-CM

## 2023-10-10 DIAGNOSIS — E78.5 DYSLIPIDEMIA: ICD-10-CM

## 2023-10-10 DIAGNOSIS — Z79.899 OTHER LONG TERM (CURRENT) DRUG THERAPY: ICD-10-CM

## 2023-10-10 DIAGNOSIS — Z09 FOLLOW-UP EXAM: Primary | ICD-10-CM

## 2023-10-10 DIAGNOSIS — J45.20 MILD INTERMITTENT ASTHMA WITHOUT COMPLICATION: Chronic | ICD-10-CM

## 2023-10-10 DIAGNOSIS — K21.9 GASTROESOPHAGEAL REFLUX DISEASE, UNSPECIFIED WHETHER ESOPHAGITIS PRESENT: ICD-10-CM

## 2023-10-10 DIAGNOSIS — I10 ESSENTIAL HYPERTENSION: ICD-10-CM

## 2023-10-10 PROCEDURE — 3078F DIAST BP <80 MM HG: CPT | Mod: CPTII,,, | Performed by: INTERNAL MEDICINE

## 2023-10-10 PROCEDURE — 3075F SYST BP GE 130 - 139MM HG: CPT | Mod: CPTII,,, | Performed by: INTERNAL MEDICINE

## 2023-10-10 PROCEDURE — 3288F FALL RISK ASSESSMENT DOCD: CPT | Mod: CPTII,,, | Performed by: INTERNAL MEDICINE

## 2023-10-10 PROCEDURE — 99215 OFFICE O/P EST HI 40 MIN: CPT | Mod: PBBFAC | Performed by: INTERNAL MEDICINE

## 2023-10-10 PROCEDURE — 3078F PR MOST RECENT DIASTOLIC BLOOD PRESSURE < 80 MM HG: ICD-10-PCS | Mod: CPTII,,, | Performed by: INTERNAL MEDICINE

## 2023-10-10 PROCEDURE — 1101F PT FALLS ASSESS-DOCD LE1/YR: CPT | Mod: CPTII,,, | Performed by: INTERNAL MEDICINE

## 2023-10-10 PROCEDURE — 1126F PR PAIN SEVERITY QUANTIFIED, NO PAIN PRESENT: ICD-10-PCS | Mod: CPTII,,, | Performed by: INTERNAL MEDICINE

## 2023-10-10 PROCEDURE — 1159F PR MEDICATION LIST DOCUMENTED IN MEDICAL RECORD: ICD-10-PCS | Mod: CPTII,,, | Performed by: INTERNAL MEDICINE

## 2023-10-10 PROCEDURE — 99214 PR OFFICE/OUTPT VISIT, EST, LEVL IV, 30-39 MIN: ICD-10-PCS | Mod: S$PBB,,, | Performed by: INTERNAL MEDICINE

## 2023-10-10 PROCEDURE — 3075F PR MOST RECENT SYSTOLIC BLOOD PRESS GE 130-139MM HG: ICD-10-PCS | Mod: CPTII,,, | Performed by: INTERNAL MEDICINE

## 2023-10-10 PROCEDURE — 3288F PR FALLS RISK ASSESSMENT DOCUMENTED: ICD-10-PCS | Mod: CPTII,,, | Performed by: INTERNAL MEDICINE

## 2023-10-10 PROCEDURE — 1101F PR PT FALLS ASSESS DOC 0-1 FALLS W/OUT INJ PAST YR: ICD-10-PCS | Mod: CPTII,,, | Performed by: INTERNAL MEDICINE

## 2023-10-10 PROCEDURE — 3008F PR BODY MASS INDEX (BMI) DOCUMENTED: ICD-10-PCS | Mod: CPTII,,, | Performed by: INTERNAL MEDICINE

## 2023-10-10 PROCEDURE — 1126F AMNT PAIN NOTED NONE PRSNT: CPT | Mod: CPTII,,, | Performed by: INTERNAL MEDICINE

## 2023-10-10 PROCEDURE — 1159F MED LIST DOCD IN RCRD: CPT | Mod: CPTII,,, | Performed by: INTERNAL MEDICINE

## 2023-10-10 PROCEDURE — 3008F BODY MASS INDEX DOCD: CPT | Mod: CPTII,,, | Performed by: INTERNAL MEDICINE

## 2023-10-10 PROCEDURE — 99214 OFFICE O/P EST MOD 30 MIN: CPT | Mod: S$PBB,,, | Performed by: INTERNAL MEDICINE

## 2023-10-10 RX ORDER — FLUTICASONE PROPIONATE AND SALMETEROL 50; 500 UG/1; UG/1
1 POWDER RESPIRATORY (INHALATION) 2 TIMES DAILY
Qty: 60 EACH | Refills: 11 | Status: SHIPPED | OUTPATIENT
Start: 2023-10-10 | End: 2024-10-09

## 2023-10-10 NOTE — PROGRESS NOTES
"Subjective:       Patient ID: Kash Dumont is a 68 y.o. female.    Chief Complaint: Follow-up and Knee Pain ((R) knee pain, states she gets sharp pain when she is walking. )    The patient is a 66-year-old female that presents today to establish care.  She has a history of asthma which is well controlled, hypertension, osteoarthritis.  She is she is up-to-date on her health maintenance issues.  She does have a history of breast cancer in 2014. She had a right mastectomy.  She is in remission.  She has not received COVID vaccine.  We have discussed the pros and cons and I have encouraged her to get the vaccine.  Her main complaint today is of right hip pain which has been ongoing for several months.  She has a history of osteoarthritis and had her knee replaced in 2015. She denies any weakness.  Today she is resting comfortably in no distress.  She is afebrile and vital signs are stable.     4/24/23-Ms. Dumont presents today for follow-up.  No significant changes in her health since we last saw her.  She had some lab work done back in March which we have reviewed.  Her blood pressure looks good today it is 130/78.  We do need to set her up for a DEXA bone scan.  She states that her breathing is better.  Her right hip pain has resolved with no further issues.  She does have some bilateral wrist pain.  It gets worse at night.  She denies any numbness or tingling.  Today she is resting comfortably in no distress.  She is afebrile and vital signs are stable.    10/10/23-the patient presents today for follow-up.  No significant changes in her health since we last saw her.  We had set her up for a DEXA bone scan but something came up and she had to cancel.  Therefore we need to reorder.  She continues to complain of the "spots" on her arms and legs.  And would like to see Dermatology.  Blood pressure looks good today it is 134/72.  Her breathing is okay she has not had any acute exacerbations recently.  O2 sat 96% on " room air.  She states that [facility] substitutes the Advair for their generic version which she has trouble taking.  She is resting comfortably today in no distress.  She is afebrile and vital signs are stable.    Follow-up  Associated symptoms include arthralgias. Pertinent negatives include no abdominal pain, chest pain, chills, congestion, coughing, fatigue, fever, headaches, joint swelling, myalgias, nausea, neck pain, rash, sore throat or weakness.   Knee Pain       Review of Systems   Constitutional:  Negative for appetite change, chills, fatigue and fever.   HENT:  Negative for nasal congestion, ear pain, hearing loss, sinus pressure/congestion and sore throat.    Eyes:  Negative for pain, redness and visual disturbance.   Respiratory:  Negative for apnea, cough, shortness of breath and wheezing.    Cardiovascular:  Negative for chest pain and palpitations.   Gastrointestinal:  Negative for abdominal pain, constipation, diarrhea and nausea.   Endocrine: Negative for cold intolerance, heat intolerance and polyuria.   Genitourinary:  Negative for dysuria and hematuria.   Musculoskeletal:  Positive for arthralgias. Negative for back pain, joint swelling, myalgias and neck pain.   Integumentary:  Negative for color change, pallor, rash and wound.   Allergic/Immunologic: Negative for immunocompromised state.   Neurological:  Negative for tremors, seizures, weakness, headaches and memory loss.   Hematological:  Negative for adenopathy.   Psychiatric/Behavioral:  Negative for confusion, dysphoric mood and sleep disturbance. The patient is not nervous/anxious.          Objective:      Physical Exam  Vitals and nursing note reviewed.   Constitutional:       General: She is not in acute distress.     Appearance: Normal appearance. She is obese. She is not ill-appearing.   HENT:      Head: Normocephalic and atraumatic.      Right Ear: External ear normal.      Left Ear: External ear normal.      Nose: Nose normal.       Mouth/Throat:      Pharynx: Oropharynx is clear.   Eyes:      Extraocular Movements: Extraocular movements intact.      Conjunctiva/sclera: Conjunctivae normal.      Pupils: Pupils are equal, round, and reactive to light.   Neck:      Vascular: No carotid bruit.   Cardiovascular:      Rate and Rhythm: Normal rate and regular rhythm.      Pulses: Normal pulses.      Heart sounds: Normal heart sounds. No murmur heard.  Pulmonary:      Effort: No respiratory distress.      Breath sounds: Normal breath sounds. No wheezing or rales.   Abdominal:      General: Bowel sounds are normal.      Palpations: Abdomen is soft.   Musculoskeletal:         General: No tenderness. Normal range of motion.      Cervical back: Normal range of motion and neck supple.      Right lower leg: No edema.      Left lower leg: No edema.   Skin:     General: Skin is warm and dry.      Capillary Refill: Capillary refill takes less than 2 seconds.      Coloration: Skin is not pale.      Comments: Places on extensor surfaces of arms of small losses of [id number]-xjeksxsa-hdlnl like idiopathic guttate hypomelanosis   Neurological:      General: No focal deficit present.      Mental Status: She is alert and oriented to person, place, and time.      Cranial Nerves: No cranial nerve deficit.      Motor: No weakness.      Gait: Gait normal.   Psychiatric:         Mood and Affect: Mood normal.         Judgment: Judgment normal.         Assessment:       Problem List Items Addressed This Visit          Derm    Idiopathic guttate hypomelanosis    Relevant Orders    Ambulatory referral/consult to Dermatology       Pulmonary    Mild intermittent asthma without complication (Chronic)       Cardiac/Vascular    Essential hypertension    Relevant Orders    Hemoglobin A1C    Dyslipidemia    Relevant Orders    Hemoglobin A1C       Hematology    History of pulmonary embolus (PE)       GI    Gastroesophageal reflux disease     Other Visit Diagnoses       Follow-up  exam    -  Primary    Other long term (current) drug therapy        Relevant Orders    Hemoglobin A1C            Plan:       1.  The patient presents today for follow up.  Age-appropriate health screenings are up-to-date.  We have reviewed lab work.  This was done in March.  Glucose is 88, creatinine 0.76.  H&H 13.6 and 42.4.  We are going to set her up for a DEXA scan today.  She was not able to complete the test this last time and had to postpone it.    2. Asthma-stable with no acute issues.  She is on Advair and ventolin as needed.  She also takes Singulair.  4/21- having to use rescue inhaler more recently. She saw Dr. Ziegler about 1 month ago and he made some changes in her regimen  10/25- Doing well. She saw Dr. Olson in September. Advair increased to 500-50  4/25/23-no acute issues.  Her respiratory status is stable  10/10/23-changing Advair to dispense as written to avoid generic substitution.  The generic but she is getting from [facility] she has difficulty using.    3. Essential hypertension-blood pressure looks good today.  She is currently on hydralazine, losartan-hydrochlorothiazide 100-12.5, propranolol 120 mg daily.  This is a unique regimen but given her good control we will not make any changes at this time   4/25/23--blood pressure 130/78 today.  Continue with current care.  Heart rate is 66  10/10/23-blood pressure is 134/72.  Heart rate is 81.  We will continue with hydralazine, losartan-hydrochlorothiazide, and propranolol    4.GERD- stable. Continue with ppi    5. Osteoarthritis- the patient has had issues with osteoarthritis in the past.  This is clinically suggestive of arthritic pains in the right hip.  We will get an x-ray and refer to Orthopedics.  The patient takes Lyrica for some paresthesias and this should help with her arthritic pains also.  4/25/23-hip pain is better.  She has bilateral wrist pain now which is likely secondary to osteoarthritis versus carpal tunnel.  We have  discussed using wrist splints at bedtime.  Also Voltaren gel p.r.n..  Continue with Celebrex  10/10/23-she has arthralgias but appears to be doing okay.    6. Skin lesion- small pinpoint lesions on extensor surfaces of arms and legs. Looks like guttate hypomelanosis. Can be treated with cryotherapy, Topical retinoids or steroids. Currently not causing any issues. Can refer to dermatology if needed  4/25/23-no acute issues  10/10/23-she would like to see Dermatology.  We can make that referral.      7. Dyslipidemia-recent lipid panel done on March 10, 2023 with total cholesterol of 258, HDL 84 and .  Based on current statin guidelines a moderate intensity statin is recommended.  I have discussed this with the patient and she is agreed to proceed.  We will start atorvastatin 10 mg daily  She is tolerating atorvastatin 10 mg.  We can repeat a lipid panel next visit    She will f/u in 6 months or sooner if needed

## 2023-10-23 ENCOUNTER — HOSPITAL ENCOUNTER (OUTPATIENT)
Dept: RADIOLOGY | Facility: HOSPITAL | Age: 68
Discharge: HOME | End: 2023-10-23
Attending: INTERNAL MEDICINE
Payer: MEDICARE

## 2023-10-23 DIAGNOSIS — M25.551 RIGHT HIP PAIN: Primary | ICD-10-CM

## 2023-10-23 DIAGNOSIS — Z13.820 OSTEOPOROSIS SCREENING: ICD-10-CM

## 2023-10-23 DIAGNOSIS — Z78.0 ASYMPTOMATIC MENOPAUSAL STATE: ICD-10-CM

## 2023-10-23 PROCEDURE — 77080 DXA BONE DENSITY AXIAL: CPT | Mod: 26,,, | Performed by: RADIOLOGY

## 2023-10-23 PROCEDURE — 77080 DXA BONE DENSITY AXIAL SKELETON 1 OR MORE SITES: ICD-10-PCS | Mod: 26,,, | Performed by: RADIOLOGY

## 2023-10-23 PROCEDURE — 77080 DXA BONE DENSITY AXIAL: CPT | Mod: TC

## 2023-10-23 RX ORDER — CELECOXIB 200 MG/1
200 CAPSULE ORAL
Qty: 90 CAPSULE | Refills: 1 | OUTPATIENT
Start: 2023-10-23

## 2023-10-24 RX ORDER — CELECOXIB 200 MG/1
200 CAPSULE ORAL DAILY
Qty: 90 CAPSULE | Refills: 0 | Status: SHIPPED | OUTPATIENT
Start: 2023-10-24 | End: 2024-01-22

## 2023-11-06 RX ORDER — PREGABALIN 50 MG/1
50 CAPSULE ORAL 2 TIMES DAILY
Qty: 60 CAPSULE | Refills: 2 | Status: SHIPPED | OUTPATIENT
Start: 2023-11-06 | End: 2024-02-07 | Stop reason: SDUPTHER

## 2023-11-06 NOTE — TELEPHONE ENCOUNTER
----- Message from Malini Xiong sent at 11/6/2023  3:49 PM CST -----  Patient need a refill on some of her medicine. She couldn't tell me the name. Please give her a call back.

## 2023-12-09 DIAGNOSIS — Z71.89 COMPLEX CARE COORDINATION: ICD-10-CM

## 2024-01-09 ENCOUNTER — OFFICE VISIT (OUTPATIENT)
Dept: OBSTETRICS AND GYNECOLOGY | Facility: CLINIC | Age: 69
End: 2024-01-09
Payer: MEDICARE

## 2024-01-09 VITALS
HEIGHT: 65 IN | WEIGHT: 199 LBS | HEART RATE: 86 BPM | DIASTOLIC BLOOD PRESSURE: 91 MMHG | SYSTOLIC BLOOD PRESSURE: 151 MMHG | BODY MASS INDEX: 33.15 KG/M2

## 2024-01-09 DIAGNOSIS — Z01.419 WOMEN'S ANNUAL ROUTINE GYNECOLOGICAL EXAMINATION: Primary | ICD-10-CM

## 2024-01-09 DIAGNOSIS — Z12.72 SPECIAL SCREENING FOR MALIGNANT NEOPLASMS, VAGINA: ICD-10-CM

## 2024-01-09 PROCEDURE — 88142 CYTOPATH C/V THIN LAYER: CPT | Mod: TC,GCY | Performed by: OBSTETRICS & GYNECOLOGY

## 2024-01-09 PROCEDURE — 1160F RVW MEDS BY RX/DR IN RCRD: CPT | Mod: CPTII,,, | Performed by: OBSTETRICS & GYNECOLOGY

## 2024-01-09 PROCEDURE — 3077F SYST BP >= 140 MM HG: CPT | Mod: CPTII,,, | Performed by: OBSTETRICS & GYNECOLOGY

## 2024-01-09 PROCEDURE — G0101 CA SCREEN;PELVIC/BREAST EXAM: HCPCS | Mod: S$PBB,GZ,, | Performed by: OBSTETRICS & GYNECOLOGY

## 2024-01-09 PROCEDURE — 99214 OFFICE O/P EST MOD 30 MIN: CPT | Mod: PBBFAC | Performed by: OBSTETRICS & GYNECOLOGY

## 2024-01-09 PROCEDURE — 3008F BODY MASS INDEX DOCD: CPT | Mod: CPTII,,, | Performed by: OBSTETRICS & GYNECOLOGY

## 2024-01-09 PROCEDURE — 3080F DIAST BP >= 90 MM HG: CPT | Mod: CPTII,,, | Performed by: OBSTETRICS & GYNECOLOGY

## 2024-01-09 PROCEDURE — 1159F MED LIST DOCD IN RCRD: CPT | Mod: CPTII,,, | Performed by: OBSTETRICS & GYNECOLOGY

## 2024-01-10 NOTE — PROGRESS NOTES
Kash Dumont female  for   Chief Complaint   Patient presents with    Gynecologic Exam     Annual exam w PAP      OB History          5    Para   5    Term   5            AB        Living             SAB        IAB        Ectopic        Multiple        Live Births                      Past Medical History:   Diagnosis Date    Acute sinusitis     Asthma     Breast cancer     Cough     Dyspnea     History of breast cancer     Hypertension     Pulmonary nodule       Past Surgical History:   Procedure Laterality Date    COLONOSCOPY      HYSTERECTOMY      MASTECTOMY Right     OOPHORECTOMY      TUBAL LIGATION        Review of patient's allergies indicates:  No Known Allergies          Physical exam:     General Appearance: healthy    Chest:chest clear, no wheezing, rales, normal symmetric air entry, Heart exam - S1, S2 normal, no murmur, no gallop, rate regular    Breast:  normal appearance, no masses or tenderness.  Right breast is surgically absent.    Abdomen:Normal, benign.    Pelvic:   Vulva :  Normal vulva  Vagina: normal vagina, no discharge, exudate, lesion, or erythema  Uterus:  cervix is absent; adnexa not palpable    Rectal: normal and Hemoccult negative  Extremity:normal    Skin: normal exam        Assessment:   Problem List Items Addressed This Visit    None  Visit Diagnoses       Women's annual routine gynecological examination    -  Primary    Relevant Orders    ThinPrep Pap Test    Special screening for malignant neoplasms, vagina        Relevant Orders    ThinPrep Pap Test             Plan:  A Pap smear was done today.  She had a mammogram in October.  She had a colonoscopy 5-6 years ago.

## 2024-01-20 DIAGNOSIS — M25.551 RIGHT HIP PAIN: ICD-10-CM

## 2024-01-22 RX ORDER — CELECOXIB 200 MG/1
200 CAPSULE ORAL
Qty: 90 CAPSULE | Refills: 0 | Status: SHIPPED | OUTPATIENT
Start: 2024-01-22 | End: 2024-04-22

## 2024-01-30 RX ORDER — ATORVASTATIN CALCIUM 10 MG/1
10 TABLET, FILM COATED ORAL
Qty: 90 TABLET | Refills: 3 | Status: SHIPPED | OUTPATIENT
Start: 2024-01-30 | End: 2024-04-22 | Stop reason: SDUPTHER

## 2024-02-07 RX ORDER — PREGABALIN 50 MG/1
50 CAPSULE ORAL 2 TIMES DAILY
Qty: 60 CAPSULE | Refills: 2 | Status: SHIPPED | OUTPATIENT
Start: 2024-02-07 | End: 2024-04-22 | Stop reason: SDUPTHER

## 2024-02-07 NOTE — TELEPHONE ENCOUNTER
----- Message from Micaela Ojeda sent at 2/7/2024 10:29 AM CST -----  Patient refills:  pregabalin (LYRICA) 50 MG capsule      Preferred pharmacy:  [id number] - [place], [place] - [address] AT [facility] [id number] [facility] [id number]: [phone] Fax: [phone]: Not open 24 hours

## 2024-02-26 ENCOUNTER — OFFICE VISIT (OUTPATIENT)
Dept: FAMILY MEDICINE | Facility: CLINIC | Age: 69
End: 2024-02-26
Payer: MEDICARE

## 2024-02-26 VITALS
TEMPERATURE: 99 F | HEART RATE: 67 BPM | WEIGHT: 210 LBS | RESPIRATION RATE: 16 BRPM | BODY MASS INDEX: 34.99 KG/M2 | OXYGEN SATURATION: 97 % | SYSTOLIC BLOOD PRESSURE: 171 MMHG | DIASTOLIC BLOOD PRESSURE: 103 MMHG | HEIGHT: 65 IN

## 2024-02-26 DIAGNOSIS — M19.90 OSTEOARTHRITIS, UNSPECIFIED OSTEOARTHRITIS TYPE, UNSPECIFIED SITE: Primary | ICD-10-CM

## 2024-02-26 DIAGNOSIS — M62.838 MUSCLE SPASM: ICD-10-CM

## 2024-02-26 PROCEDURE — 1101F PT FALLS ASSESS-DOCD LE1/YR: CPT | Mod: ,,, | Performed by: FAMILY MEDICINE

## 2024-02-26 PROCEDURE — 96372 THER/PROPH/DIAG INJ SC/IM: CPT | Mod: ,,, | Performed by: FAMILY MEDICINE

## 2024-02-26 PROCEDURE — 1159F MED LIST DOCD IN RCRD: CPT | Mod: ,,, | Performed by: FAMILY MEDICINE

## 2024-02-26 PROCEDURE — 99204 OFFICE O/P NEW MOD 45 MIN: CPT | Mod: 25,,, | Performed by: FAMILY MEDICINE

## 2024-02-26 PROCEDURE — 3008F BODY MASS INDEX DOCD: CPT | Mod: ,,, | Performed by: FAMILY MEDICINE

## 2024-02-26 PROCEDURE — 3080F DIAST BP >= 90 MM HG: CPT | Mod: ,,, | Performed by: FAMILY MEDICINE

## 2024-02-26 PROCEDURE — 3288F FALL RISK ASSESSMENT DOCD: CPT | Mod: ,,, | Performed by: FAMILY MEDICINE

## 2024-02-26 PROCEDURE — 1160F RVW MEDS BY RX/DR IN RCRD: CPT | Mod: ,,, | Performed by: FAMILY MEDICINE

## 2024-02-26 PROCEDURE — 1125F AMNT PAIN NOTED PAIN PRSNT: CPT | Mod: ,,, | Performed by: FAMILY MEDICINE

## 2024-02-26 PROCEDURE — 3077F SYST BP >= 140 MM HG: CPT | Mod: ,,, | Performed by: FAMILY MEDICINE

## 2024-02-26 RX ORDER — PREDNISONE 20 MG/1
20 TABLET ORAL DAILY
Qty: 5 TABLET | Refills: 0 | Status: SHIPPED | OUTPATIENT
Start: 2024-02-26 | End: 2024-03-02

## 2024-02-26 RX ORDER — DEXAMETHASONE SODIUM PHOSPHATE 4 MG/ML
4 INJECTION, SOLUTION INTRA-ARTICULAR; INTRALESIONAL; INTRAMUSCULAR; INTRAVENOUS; SOFT TISSUE
Status: COMPLETED | OUTPATIENT
Start: 2024-02-26 | End: 2024-02-26

## 2024-02-26 RX ORDER — IBUPROFEN 800 MG/1
800 TABLET ORAL 3 TIMES DAILY PRN
Qty: 20 TABLET | Refills: 0 | Status: SHIPPED | OUTPATIENT
Start: 2024-02-26 | End: 2024-04-17

## 2024-02-26 RX ORDER — KETOROLAC TROMETHAMINE 30 MG/ML
30 INJECTION, SOLUTION INTRAMUSCULAR; INTRAVENOUS
Status: COMPLETED | OUTPATIENT
Start: 2024-02-26 | End: 2024-02-26

## 2024-02-26 RX ORDER — TIZANIDINE 4 MG/1
4 TABLET ORAL 3 TIMES DAILY PRN
Qty: 20 TABLET | Refills: 0 | Status: SHIPPED | OUTPATIENT
Start: 2024-02-26 | End: 2024-03-07

## 2024-02-26 RX ADMIN — DEXAMETHASONE SODIUM PHOSPHATE 4 MG: 4 INJECTION, SOLUTION INTRA-ARTICULAR; INTRALESIONAL; INTRAMUSCULAR; INTRAVENOUS; SOFT TISSUE at 11:02

## 2024-02-26 RX ADMIN — KETOROLAC TROMETHAMINE 30 MG: 30 INJECTION, SOLUTION INTRAMUSCULAR; INTRAVENOUS at 11:02

## 2024-02-26 NOTE — PROGRESS NOTES
Subjective:       Patient ID: Kash Dumont is a 68 y.o. female.    Chief Complaint: Knee Pain (Right knee pain for a wk. No known injury. ), Hip Pain (Right hip pain for a wk.), and Back Pain (Lower back pain for a wk. )    Knee Pain   Pertinent negatives include no numbness.   Hip Pain   Pertinent negatives include no numbness.   Back Pain  Associated symptoms include leg pain. Pertinent negatives include no abdominal pain, bladder incontinence, chest pain, dysuria, fever, headaches, numbness, pelvic pain or weakness.     Review of Systems   Constitutional:  Negative for activity change, appetite change, chills, diaphoresis, fatigue, fever and unexpected weight change.   HENT:  Negative for nasal congestion, dental problem, drooling, ear discharge, ear pain, facial swelling, hearing loss, mouth sores, nosebleeds, postnasal drip, rhinorrhea, sinus pressure/congestion, sneezing, sore throat, tinnitus, trouble swallowing, voice change and goiter.    Eyes:  Negative for photophobia, discharge, itching and visual disturbance.   Respiratory:  Negative for apnea, cough, choking, chest tightness, shortness of breath, wheezing and stridor.    Cardiovascular:  Negative for chest pain, palpitations, leg swelling and claudication.   Gastrointestinal:  Negative for abdominal distention, abdominal pain, anal bleeding, blood in stool, change in bowel habit, constipation, diarrhea, nausea and vomiting.   Endocrine: Negative for cold intolerance, heat intolerance, polydipsia, polyphagia and polyuria.   Genitourinary:  Negative for bladder incontinence, decreased urine volume, difficulty urinating, dysuria, enuresis, flank pain, frequency, hematuria, nocturia, pelvic pain and urgency.   Musculoskeletal:  Positive for arthralgias, back pain and leg pain. Negative for gait problem, joint swelling, myalgias, neck pain, neck stiffness and joint deformity.   Integumentary:  Negative for pallor, rash, wound, breast mass and breast  tenderness.   Allergic/Immunologic: Negative for environmental allergies, food allergies and immunocompromised state.   Neurological:  Negative for dizziness, vertigo, tremors, seizures, syncope, facial asymmetry, speech difficulty, weakness, light-headedness, numbness, headaches, memory loss and coordination difficulties.   Hematological:  Negative for adenopathy. Does not bruise/bleed easily.   Psychiatric/Behavioral:  Negative for agitation, behavioral problems, confusion, decreased concentration, dysphoric mood, hallucinations, self-injury, sleep disturbance and suicidal ideas. The patient is not nervous/anxious and is not hyperactive.    Breast: Negative for mass and tenderness        Objective:      Physical Exam  Vitals reviewed.   Constitutional:       Appearance: Normal appearance.   HENT:      Head: Normocephalic and atraumatic.      Right Ear: Tympanic membrane, ear canal and external ear normal.      Left Ear: Tympanic membrane, ear canal and external ear normal.      Nose: Nose normal.      Mouth/Throat:      Mouth: Mucous membranes are moist.      Pharynx: Oropharynx is clear.   Eyes:      Extraocular Movements: Extraocular movements intact.      Conjunctiva/sclera: Conjunctivae normal.      Pupils: Pupils are equal, round, and reactive to light.   Cardiovascular:      Rate and Rhythm: Normal rate and regular rhythm.      Pulses: Normal pulses.      Heart sounds: Normal heart sounds.   Pulmonary:      Effort: Pulmonary effort is normal.      Breath sounds: Normal breath sounds.   Abdominal:      General: Bowel sounds are normal.      Palpations: Abdomen is soft.   Musculoskeletal:         General: Tenderness present. Normal range of motion.      Cervical back: Normal range of motion and neck supple.   Skin:     General: Skin is warm and dry.   Neurological:      General: No focal deficit present.      Mental Status: She is alert. Mental status is at baseline.   Psychiatric:         Mood and Affect:  Mood normal.         Behavior: Behavior normal.         Thought Content: Thought content normal.         Judgment: Judgment normal.         Assessment:       1. Osteoarthritis, unspecified osteoarthritis type, unspecified site    2. Muscle spasm        Plan:     Osteoarthritis, unspecified osteoarthritis type, unspecified site  -     Ambulatory referral/consult to Orthopedics; Future; Expected date: 03/04/2024    Muscle spasm  -     ketorolac injection 30 mg  -     dexAMETHasone injection 4 mg    Other orders  -     predniSONE (DELTASONE) 20 MG tablet; Take 1 tablet (20 mg total) by mouth once daily. for 5 days  Dispense: 5 tablet; Refill: 0  -     tiZANidine (ZANAFLEX) 4 MG tablet; Take 1 tablet (4 mg total) by mouth 3 (three) times daily as needed (spasm).  Dispense: 20 tablet; Refill: 0  -     ibuprofen (ADVIL,MOTRIN) 800 MG tablet; Take 1 tablet (800 mg total) by mouth 3 (three) times daily as needed for Pain.  Dispense: 20 tablet; Refill: 0

## 2024-02-27 ENCOUNTER — TELEPHONE (OUTPATIENT)
Dept: ORTHOPEDICS | Facility: CLINIC | Age: 69
End: 2024-02-27
Payer: MEDICARE

## 2024-02-27 NOTE — TELEPHONE ENCOUNTER
Patient will call back to get an appointment for next week scheduled with Gregoria Jalloh. The visit is for right knee pain.

## 2024-03-04 DIAGNOSIS — M25.551 RIGHT HIP PAIN: Primary | ICD-10-CM

## 2024-03-04 DIAGNOSIS — M25.561 RIGHT KNEE PAIN, UNSPECIFIED CHRONICITY: ICD-10-CM

## 2024-03-05 ENCOUNTER — HOSPITAL ENCOUNTER (OUTPATIENT)
Dept: RADIOLOGY | Facility: HOSPITAL | Age: 69
Discharge: HOME | End: 2024-03-05
Payer: MEDICARE

## 2024-03-05 ENCOUNTER — OFFICE VISIT (OUTPATIENT)
Dept: ORTHOPEDICS | Facility: CLINIC | Age: 69
End: 2024-03-05
Payer: MEDICARE

## 2024-03-05 VITALS — WEIGHT: 210 LBS | HEIGHT: 65 IN | BODY MASS INDEX: 34.99 KG/M2

## 2024-03-05 DIAGNOSIS — M25.551 RIGHT HIP PAIN: ICD-10-CM

## 2024-03-05 DIAGNOSIS — M17.11 PRIMARY OSTEOARTHRITIS OF RIGHT KNEE: Primary | ICD-10-CM

## 2024-03-05 DIAGNOSIS — M19.90 OSTEOARTHRITIS, UNSPECIFIED OSTEOARTHRITIS TYPE, UNSPECIFIED SITE: ICD-10-CM

## 2024-03-05 DIAGNOSIS — G89.29 CHRONIC RIGHT-SIDED LOW BACK PAIN, UNSPECIFIED WHETHER SCIATICA PRESENT: ICD-10-CM

## 2024-03-05 DIAGNOSIS — M54.50 CHRONIC RIGHT-SIDED LOW BACK PAIN, UNSPECIFIED WHETHER SCIATICA PRESENT: ICD-10-CM

## 2024-03-05 DIAGNOSIS — M25.561 RIGHT KNEE PAIN, UNSPECIFIED CHRONICITY: ICD-10-CM

## 2024-03-05 PROCEDURE — 20610 DRAIN/INJ JOINT/BURSA W/O US: CPT | Mod: PBBFAC

## 2024-03-05 PROCEDURE — 1159F MED LIST DOCD IN RCRD: CPT | Mod: CPTII,,,

## 2024-03-05 PROCEDURE — 99215 OFFICE O/P EST HI 40 MIN: CPT | Mod: PBBFAC,25

## 2024-03-05 PROCEDURE — 73502 X-RAY EXAM HIP UNI 2-3 VIEWS: CPT | Mod: 26,RT,, | Performed by: RADIOLOGY

## 2024-03-05 PROCEDURE — 99999PBSHW PR PBB SHADOW TECHNICAL ONLY FILED TO HB: Mod: PBBFAC,,,

## 2024-03-05 PROCEDURE — 73502 X-RAY EXAM HIP UNI 2-3 VIEWS: CPT | Mod: TC,RT

## 2024-03-05 PROCEDURE — 3008F BODY MASS INDEX DOCD: CPT | Mod: CPTII,,,

## 2024-03-05 PROCEDURE — 73562 X-RAY EXAM OF KNEE 3: CPT | Mod: TC,RT

## 2024-03-05 PROCEDURE — 73562 X-RAY EXAM OF KNEE 3: CPT | Mod: 26,RT,, | Performed by: RADIOLOGY

## 2024-03-05 PROCEDURE — 99214 OFFICE O/P EST MOD 30 MIN: CPT | Mod: S$PBB,25,,

## 2024-03-05 RX ORDER — TRIAMCINOLONE ACETONIDE 40 MG/ML
40 INJECTION, SUSPENSION INTRA-ARTICULAR; INTRAMUSCULAR
Status: DISCONTINUED | OUTPATIENT
Start: 2024-03-05 | End: 2024-03-05 | Stop reason: HOSPADM

## 2024-03-05 RX ORDER — BUPIVACAINE HYDROCHLORIDE 2.5 MG/ML
1 INJECTION, SOLUTION EPIDURAL; INFILTRATION; INTRACAUDAL
Status: DISCONTINUED | OUTPATIENT
Start: 2024-03-05 | End: 2024-03-05 | Stop reason: HOSPADM

## 2024-03-05 RX ADMIN — TRIAMCINOLONE ACETONIDE 40 MG: 40 INJECTION, SUSPENSION INTRA-ARTICULAR; INTRAMUSCULAR at 08:03

## 2024-03-05 RX ADMIN — BUPIVACAINE HYDROCHLORIDE 1 ML: 2.5 INJECTION, SOLUTION EPIDURAL; INFILTRATION; INTRACAUDAL at 08:03

## 2024-03-05 NOTE — PROGRESS NOTES
CC:   Chief Complaint   Patient presents with    Right Knee - Pain    Right Hip - Pain          Kash Dumont is a 68 y.o. female seen today for Pain of the Right Knee and Pain of the Right Hip   Patient reports over a year history of right knee pain and right hip pain.  Patient states the pain has worsened over the past week.  She denies any fall or injury.  Patient states that her hip pain starts on the outside of her hip and radiates up into the back.  She denies any groin pain.  Pain is worse when standing for long periods of time.  Patient also admits to right knee pain and swelling over the past week.  She states it hurts to walk.  Pain wakes her up at night.  No other complaints.      PAST MEDICAL HISTORY:   Past Medical History:   Diagnosis Date    Acute sinusitis     Asthma     Breast cancer     Cough     Dyspnea     History of breast cancer     Hypertension     Pulmonary nodule           PAST SURGICAL HISTORY:   Past Surgical History:   Procedure Laterality Date    COLONOSCOPY      HYSTERECTOMY      MASTECTOMY Right     OOPHORECTOMY      TUBAL LIGATION            ALLERGIES: Review of patient's allergies indicates:  No Known Allergies     MEDICATIONS :    Current Outpatient Medications:     ADVAIR DISKUS 500-50 mcg/dose DsDv diskus inhaler, Inhale 1 puff into the lungs 2 (two) times daily. Controller, Disp: 60 each, Rfl: 11    albuterol (PROVENTIL/VENTOLIN HFA) 90 mcg/actuation inhaler, INHALE 2 PUFFS BY MOUTH EVERY 4 HOURS AS NEEDED FOR WHEEZING ...SHAKE WELL BEFORE USING... Strength: 90 mcg/actuation, Disp: 8.5 g, Rfl: 6    atorvastatin (LIPITOR) 10 MG tablet, TAKE 1 TABLET(10 MG) BY MOUTH EVERY DAY, Disp: 90 tablet, Rfl: 3    celecoxib (CELEBREX) 200 MG capsule, TAKE 1 CAPSULE(200 MG) BY MOUTH EVERY DAY, Disp: 90 capsule, Rfl: 0    diclofenac sodium (VOLTAREN) 1 % Gel, Apply 2 g topically 2 (two) times daily., Disp: 20 g, Rfl: 1    hydrALAZINE (APRESOLINE) 10 MG tablet, Take 1 tablet  (10 mg total) by mouth every 12 (twelve) hours., Disp: 180 tablet, Rfl: 3    ibuprofen (ADVIL,MOTRIN) 800 MG tablet, Take 1 tablet (800 mg total) by mouth 3 (three) times daily as needed for Pain., Disp: 20 tablet, Rfl: 0    losartan-hydrochlorothiazide 100-12.5 mg (HYZAAR) 100-12.5 mg Tab, Take 1 tablet by mouth once daily., Disp: 90 tablet, Rfl: 3    montelukast (SINGULAIR) 10 mg tablet, Take 1 tablet (10 mg total) by mouth once daily., Disp: 90 tablet, Rfl: 3    pregabalin (LYRICA) 50 MG capsule, Take 1 capsule (50 mg total) by mouth 2 (two) times daily., Disp: 60 capsule, Rfl: 2    propranoloL (INDERAL LA) 120 MG 24 hr capsule, Take 1 capsule (120 mg total) by mouth once daily., Disp: 90 capsule, Rfl: 3    RESTASIS 0.05 % ophthalmic emulsion, INSTILL 1 DROP INTO AFFECTED EYE(S) EVERY 12 HOURS, Disp: , Rfl:     tiZANidine (ZANAFLEX) 4 MG tablet, Take 1 tablet (4 mg total) by mouth 3 (three) times daily as needed (spasm)., Disp: 20 tablet, Rfl: 0    zolpidem (AMBIEN) 5 MG Tab, Take 5 mg by mouth nightly as needed., Disp: , Rfl:      SOCIAL HISTORY:   Social History     Socioeconomic History    Marital status: Single   Tobacco Use    Smoking status: Never    Smokeless tobacco: Never   Substance and Sexual Activity    Alcohol use: Never    Drug use: Never    Sexual activity: Yes     Partners: Male     Birth control/protection: See Surgical Hx        FAMILY HISTORY:   Family History   Problem Relation Age of Onset    Diabetes Sister     Hypertension Sister     Breast cancer Daughter     Hypertension Daughter           PHYSICAL EXAM:      There were no vitals filed for this visit.  Body mass index is 34.95 kg/m².    GENERAL: Well-developed, well-nourished female . The patient is alert, oriented and cooperative.    HEENT:  Normocephalic, atraumatic.  Extraocular movements are intact bilaterally.     NECK:  Nontender with good range of motion.    LUNGS:  Clear to auscultation bilaterally.    HEART:  Regular rate and  rhythm.     ABDOMEN:  Soft, non-tender, non-distended.      EXTREMITIES:   Right hip was skin clean dry and intact, nontender to palpation over trochanteric bursa, good range of motion with hip flexion internal and external rotation, neurovascularly intact   Right knee was skin clean dry and intact, notable soft tissue swelling and small joint effusion noted on exam, crepitus noted over patella with movement, full range of motion from 0° of extension to approximately 120° of flexion, knee is stable to varus and valgus stress testing neurovascularly intact      RADIOGRAPHIC FINDINGS:   X-Ray Knee AP LAT with Sunrise Right    Result Date: 3/5/2024  EXAMINATION: XR KNEE AP LAT WITH SUNRISE RIGHT CLINICAL HISTORY: Pain in right knee TECHNIQUE: AP, lateral, sunrise right knee radiograph COMPARISON: 09/09/2016 FINDINGS: Mild tricompartmental osteoarthritic changes with osteophyte formation are seen.  There is no fracture or dislocation.  There is a moderate suprapatellar joint effusion.     Joint effusion and degenerative change.  No acute fracture or dislocation. Electronically signed by: Taran Bullard Date:    03/05/2024 Time:    08:59    X-Ray Hip 2 or 3 views Right (with Pelvis when performed)    Result Date: 3/5/2024  EXAMINATION: XR HIP WITH PELVIS WHEN PERFORMED, 2 OR 3  VIEWS RIGHT CLINICAL HISTORY: Pain in right hip COMPARISON: 3 November 2021 TECHNIQUE: XR HIP WITH PELVIS 3  VIEWS RIGHT FINDINGS: No evidence of fracture seen.  The alignment of the joints appears normal.  Mild-to-moderate degenerative change is present.  No soft tissue abnormality is seen.     Mild-to-moderate hip osteoarthrosis. Electronically signed by: dEson Rivera Date:    03/05/2024 Time:    08:56       Patient Active Problem List    Diagnosis Date Noted    Bilateral wrist pain 04/25/2023    Dyslipidemia 04/25/2023    Idiopathic guttate hypomelanosis 10/25/2022    Allergic sinusitis 03/17/2022    Essential hypertension 10/13/2021     Gastroesophageal reflux disease 10/13/2021    Right hip pain 10/13/2021    Mild intermittent asthma without complication 03/22/2021    History of pulmonary embolus (PE) 03/22/2021     IMPRESSION AND PLAN:   1.  Primary osteoarthritis right knee.  Personally reviewed x-rays today with mild tricompartmental DJD changes, most notable in patellofemoral compartment with osteophytes.  Discussed all treatment options with the patient today.  Discussed possible aspiration of small joint effusion.  Discussed possible steroid injection following aspiration.  Patient agreed and plans to proceed with the procedure, see procedural note for details.  Discussed that these injections can be repeated every 3-4 months if needed  2. Patient presented with complaints of right hip pain however when asked where the pain originates from she pointed to her lower back.  No groin pain on exam.  Negative straight leg raise.  Discussed all treatment options with the patient.  Discussed possible referral to Dr. Morrow, we will obtain lumbar x-rays on her way out today.  Discussed that his office will call for appointment.    No follow-ups on file.       Ximena Duval PA-C      (Subject to voice recognition error, transcription service not allowed)

## 2024-03-05 NOTE — PROCEDURES
Large Joint Aspiration/Injection: R knee    Date/Time: 3/5/2024 8:00 AM    Performed by: Ximena Duval PA  Authorized by: Ximena Duval PA    Consent Done?:  Yes (Verbal)  Indications:  Arthritis and pain  Site marked: the procedure site was marked      Details:  Needle Size:  22 G  Approach:  Anterolateral  Location:  Knee  Site:  R knee  Medications:  1 mL BUPivacaine (PF) 0.25% (2.5 mg/ml) 0.25 % (2.5 mg/mL); 40 mg triamcinolone acetonide 40 mg/mL  Aspirate amount (mL):  0  Patient tolerance:  Patient tolerated the procedure well with no immediate complications     Attempted aspiration prior to injection without aspirate

## 2024-03-05 NOTE — PATIENT INSTRUCTIONS
1.  Primary osteoarthritis right knee.  Personally reviewed x-rays today with mild tricompartmental DJD changes, most notable in patellofemoral compartment with osteophytes.  Discussed all treatment options with the patient today.  Discussed possible aspiration of small joint effusion.  Discussed possible steroid injection following aspiration.  Patient agreed and plans to proceed with the procedure, see procedural note for details.  Discussed that these injections can be repeated every 3-4 months if needed  2. Patient presented with complaints of right hip pain however when asked where the pain originates from she pointed to her lower back.  No groin pain on exam.  Negative straight leg raise.  Discussed all treatment options with the patient.  Discussed possible referral to Dr. Morrow, we will obtain lumbar x-rays on her way out today.  Discussed that his office will call for appointment.

## 2024-03-11 RX ORDER — DICLOFENAC SODIUM 10 MG/G
GEL TOPICAL
Qty: 100 G | Refills: 1 | Status: SHIPPED | OUTPATIENT
Start: 2024-03-11 | End: 2024-04-22 | Stop reason: SDUPTHER

## 2024-04-10 ENCOUNTER — PATIENT OUTREACH (OUTPATIENT)
Dept: ADMINISTRATIVE | Facility: HOSPITAL | Age: 69
End: 2024-04-10

## 2024-04-10 NOTE — PROGRESS NOTES
Population Health Chart Review & Patient Outreach Details    [employer] Pay[employer] Report    Updates Requested / Reviewed:  [x]  Care Team Updated    Health Maintenance Topics Addressed and Outreach Outcomes / Actions Taken:  Blood Pressure Management [x] Telephone Outreach. No answer; left vm. Updated 04/22/24 Appt Note.               Colon Cancer Screening [x] Abstracted October 2019 Colonoscopy and Path Report (Dr. Gupta) from [facility]. HM and History Updated.

## 2024-04-20 DIAGNOSIS — M25.551 RIGHT HIP PAIN: ICD-10-CM

## 2024-04-22 ENCOUNTER — OFFICE VISIT (OUTPATIENT)
Dept: INTERNAL MEDICINE | Facility: CLINIC | Age: 69
End: 2024-04-22
Payer: MEDICARE

## 2024-04-22 VITALS
SYSTOLIC BLOOD PRESSURE: 152 MMHG | DIASTOLIC BLOOD PRESSURE: 80 MMHG | WEIGHT: 208 LBS | RESPIRATION RATE: 18 BRPM | HEIGHT: 65 IN | HEART RATE: 89 BPM | BODY MASS INDEX: 34.66 KG/M2 | OXYGEN SATURATION: 97 % | TEMPERATURE: 97 F

## 2024-04-22 DIAGNOSIS — E78.5 DYSLIPIDEMIA: ICD-10-CM

## 2024-04-22 DIAGNOSIS — J45.20 MILD INTERMITTENT ASTHMA WITHOUT COMPLICATION: Chronic | ICD-10-CM

## 2024-04-22 DIAGNOSIS — M25.551 RIGHT HIP PAIN: ICD-10-CM

## 2024-04-22 DIAGNOSIS — J30.9 ALLERGIC SINUSITIS: Chronic | ICD-10-CM

## 2024-04-22 DIAGNOSIS — Z09 FOLLOW-UP EXAM: Primary | ICD-10-CM

## 2024-04-22 DIAGNOSIS — I10 ESSENTIAL HYPERTENSION: ICD-10-CM

## 2024-04-22 DIAGNOSIS — Z86.711 HISTORY OF PULMONARY EMBOLUS (PE): ICD-10-CM

## 2024-04-22 PROCEDURE — G0009 ADMIN PNEUMOCOCCAL VACCINE: HCPCS | Mod: PBBFAC | Performed by: INTERNAL MEDICINE

## 2024-04-22 PROCEDURE — 3288F FALL RISK ASSESSMENT DOCD: CPT | Mod: CPTII,,, | Performed by: INTERNAL MEDICINE

## 2024-04-22 PROCEDURE — 90677 PCV20 VACCINE IM: CPT | Mod: PBBFAC | Performed by: INTERNAL MEDICINE

## 2024-04-22 PROCEDURE — 1125F AMNT PAIN NOTED PAIN PRSNT: CPT | Mod: CPTII,,, | Performed by: INTERNAL MEDICINE

## 2024-04-22 PROCEDURE — 1101F PT FALLS ASSESS-DOCD LE1/YR: CPT | Mod: CPTII,,, | Performed by: INTERNAL MEDICINE

## 2024-04-22 PROCEDURE — 99214 OFFICE O/P EST MOD 30 MIN: CPT | Mod: S$PBB,,, | Performed by: INTERNAL MEDICINE

## 2024-04-22 PROCEDURE — 1159F MED LIST DOCD IN RCRD: CPT | Mod: CPTII,,, | Performed by: INTERNAL MEDICINE

## 2024-04-22 PROCEDURE — 3077F SYST BP >= 140 MM HG: CPT | Mod: CPTII,,, | Performed by: INTERNAL MEDICINE

## 2024-04-22 PROCEDURE — 3008F BODY MASS INDEX DOCD: CPT | Mod: CPTII,,, | Performed by: INTERNAL MEDICINE

## 2024-04-22 PROCEDURE — 99214 OFFICE O/P EST MOD 30 MIN: CPT | Mod: PBBFAC,25 | Performed by: INTERNAL MEDICINE

## 2024-04-22 PROCEDURE — 99999PBSHW PR PBB SHADOW TECHNICAL ONLY FILED TO HB: Mod: PBBFAC,,,

## 2024-04-22 PROCEDURE — 3079F DIAST BP 80-89 MM HG: CPT | Mod: CPTII,,, | Performed by: INTERNAL MEDICINE

## 2024-04-22 RX ORDER — LOSARTAN POTASSIUM AND HYDROCHLOROTHIAZIDE 25; 100 MG/1; MG/1
1 TABLET ORAL DAILY
Qty: 90 TABLET | Refills: 3 | Status: SHIPPED | OUTPATIENT
Start: 2024-04-22 | End: 2025-04-22

## 2024-04-22 RX ORDER — MONTELUKAST SODIUM 10 MG/1
10 TABLET ORAL DAILY
Qty: 90 TABLET | Refills: 3 | Status: SHIPPED | OUTPATIENT
Start: 2024-04-22

## 2024-04-22 RX ORDER — HYDRALAZINE HYDROCHLORIDE 25 MG/1
25 TABLET, FILM COATED ORAL EVERY 8 HOURS
Qty: 270 TABLET | Refills: 1 | Status: SHIPPED | OUTPATIENT
Start: 2024-04-22

## 2024-04-22 RX ORDER — FLUTICASONE PROPIONATE AND SALMETEROL 50; 500 UG/1; UG/1
1 POWDER RESPIRATORY (INHALATION) 2 TIMES DAILY
Qty: 60 EACH | Refills: 11 | Status: SHIPPED | OUTPATIENT
Start: 2024-04-22 | End: 2025-04-22

## 2024-04-22 RX ORDER — PROPRANOLOL HYDROCHLORIDE 120 MG/1
120 CAPSULE, EXTENDED RELEASE ORAL DAILY
Qty: 90 CAPSULE | Refills: 3 | Status: SHIPPED | OUTPATIENT
Start: 2024-04-22 | End: 2024-06-13 | Stop reason: SDUPTHER

## 2024-04-22 RX ORDER — PREGABALIN 50 MG/1
50 CAPSULE ORAL 2 TIMES DAILY
Qty: 60 CAPSULE | Refills: 5 | Status: SHIPPED | OUTPATIENT
Start: 2024-04-22 | End: 2024-05-24 | Stop reason: SDUPTHER

## 2024-04-22 RX ORDER — ALBUTEROL SULFATE 90 UG/1
AEROSOL, METERED RESPIRATORY (INHALATION)
Qty: 8.5 G | Refills: 6 | Status: SHIPPED | OUTPATIENT
Start: 2024-04-22

## 2024-04-22 RX ORDER — CELECOXIB 200 MG/1
200 CAPSULE ORAL
Qty: 90 CAPSULE | Refills: 3 | Status: SHIPPED | OUTPATIENT
Start: 2024-04-22 | End: 2024-04-22 | Stop reason: SDUPTHER

## 2024-04-22 RX ORDER — DICLOFENAC SODIUM 10 MG/G
GEL TOPICAL 3 TIMES DAILY
Qty: 100 G | Refills: 1 | Status: SHIPPED | OUTPATIENT
Start: 2024-04-22

## 2024-04-22 RX ORDER — CELECOXIB 200 MG/1
200 CAPSULE ORAL DAILY
Qty: 90 CAPSULE | Refills: 3 | Status: SHIPPED | OUTPATIENT
Start: 2024-04-22

## 2024-04-22 RX ORDER — ATORVASTATIN CALCIUM 10 MG/1
10 TABLET, FILM COATED ORAL DAILY
Qty: 90 TABLET | Refills: 3 | Status: SHIPPED | OUTPATIENT
Start: 2024-04-22

## 2024-04-22 RX ADMIN — PNEUMOCOCCAL 20-VALENT CONJUGATE VACCINE 0.5 ML
2.2; 2.2; 2.2; 2.2; 2.2; 2.2; 2.2; 2.2; 2.2; 2.2; 2.2; 2.2; 2.2; 2.2; 2.2; 2.2; 4.4; 2.2; 2.2; 2.2 INJECTION, SUSPENSION INTRAMUSCULAR at 09:04

## 2024-04-22 NOTE — PROGRESS NOTES
Subjective:       Patient ID: Kash Dumont is a 68 y.o. female.    Chief Complaint: Follow-up    The patient is a 66-year-old female that presents today to establish care.  She has a history of asthma which is well controlled, hypertension, osteoarthritis.  She is she is up-to-date on her health maintenance issues.  She does have a history of breast cancer in 2014. She had a right mastectomy.  She is in remission.  She has not received COVID vaccine.  We have discussed the pros and cons and I have encouraged her to get the vaccine.  Her main complaint today is of right hip pain which has been ongoing for several months.  She has a history of osteoarthritis and had her knee replaced in 2015. She denies any weakness.  Today she is resting comfortably in no distress.  She is afebrile and vital signs are stable.     4/22/24-Mrs. Dumont is a 68-year-old female the presents today for follow-up.  She has been having more right knee pain since we last saw her.  She went into the immediate care clinic back in February and got a steroid injection.  She then followed up with Orthopedics on March 5th and had an injection in her right knee.  She is still having issues with that right knee in his supposed to follow back up with them around July.  Otherwise she is doing okay.  Her blood pressure is up today at 152/80 and it was up back in February as well.  She states that she has been taking her medications as directed.  Otherwise she is doing okay.  We have discussed recommendations regarding vaccines and we will administer the PCV 20 vaccine today.  She is currently resting comfortably in no distress.  She is afebrile and vital signs are stable.    Follow-up  Associated symptoms include arthralgias. Pertinent negatives include no abdominal pain, chest pain, chills, congestion, coughing, fatigue, fever, headaches, joint swelling, myalgias, nausea, neck pain, rash, sore throat or weakness.   Knee Pain       Review of Systems    Constitutional:  Negative for appetite change, chills, fatigue and fever.   HENT:  Negative for nasal congestion, ear pain, hearing loss, sinus pressure/congestion and sore throat.    Eyes:  Negative for pain, redness and visual disturbance.   Respiratory:  Negative for apnea, cough, shortness of breath and wheezing.    Cardiovascular:  Negative for chest pain and palpitations.   Gastrointestinal:  Negative for abdominal pain, constipation, diarrhea and nausea.   Endocrine: Negative for cold intolerance, heat intolerance and polyuria.   Genitourinary:  Negative for dysuria and hematuria.   Musculoskeletal:  Positive for arthralgias. Negative for back pain, joint swelling, myalgias and neck pain.   Integumentary:  Negative for color change, pallor, rash and wound.   Allergic/Immunologic: Negative for immunocompromised state.   Neurological:  Negative for tremors, seizures, weakness, headaches and memory loss.   Hematological:  Negative for adenopathy.   Psychiatric/Behavioral:  Negative for confusion, dysphoric mood and sleep disturbance. The patient is not nervous/anxious.          Objective:      Physical Exam  Vitals and nursing note reviewed.   Constitutional:       General: She is not in acute distress.     Appearance: Normal appearance. She is obese. She is not ill-appearing.   HENT:      Head: Normocephalic and atraumatic.      Right Ear: External ear normal.      Left Ear: External ear normal.      Nose: Nose normal.      Mouth/Throat:      Pharynx: Oropharynx is clear.   Eyes:      Extraocular Movements: Extraocular movements intact.      Conjunctiva/sclera: Conjunctivae normal.      Pupils: Pupils are equal, round, and reactive to light.   Neck:      Vascular: No carotid bruit.   Cardiovascular:      Rate and Rhythm: Normal rate and regular rhythm.      Pulses: Normal pulses.      Heart sounds: Normal heart sounds. No murmur heard.  Pulmonary:      Effort: No respiratory distress.      Breath sounds:  Normal breath sounds. No wheezing or rales.   Abdominal:      General: Bowel sounds are normal.      Palpations: Abdomen is soft.   Musculoskeletal:         General: Tenderness present. Normal range of motion.      Cervical back: Normal range of motion and neck supple.      Right lower leg: No edema.      Left lower leg: No edema.      Comments: Right knee tenderness   Skin:     General: Skin is warm and dry.      Capillary Refill: Capillary refill takes less than 2 seconds.      Coloration: Skin is not pale.      Comments: Places on extensor surfaces of arms of small losses of evpwmpkilmvg-mcauhrus-qbzaw like idiopathic guttate hypomelanosis   Neurological:      General: No focal deficit present.      Mental Status: She is alert and oriented to person, place, and time.      Cranial Nerves: No cranial nerve deficit.      Motor: No weakness.      Gait: Gait normal.   Psychiatric:         Mood and Affect: Mood normal.         Judgment: Judgment normal.         Assessment:       Problem List Items Addressed This Visit          ENT    Allergic sinusitis (Chronic)       Pulmonary    Mild intermittent asthma without complication (Chronic)    Relevant Medications    albuterol (PROVENTIL/VENTOLIN HFA) 90 mcg/actuation inhaler       Cardiac/Vascular    Essential hypertension    Dyslipidemia       Hematology    History of pulmonary embolus (PE)       Orthopedic    Right hip pain    Relevant Medications    celecoxib (CELEBREX) 200 MG capsule     Other Visit Diagnoses       Follow-up exam    -  Primary            Plan:       1.  The patient presents today for follow up.  Age-appropriate health screenings are up-to-date.  We are going to administer PCV 20 today to complete the Pneumovax series.  We have also discussed shingles and RSV vaccine.  She is in contemplation stage.  She can get these done through her pharmacy.    2. Asthma-stable with no acute issues.  She is on Advair and ventolin as needed.  She also takes  Singulair.    3. Essential hypertension-blood pressure l is elevated today at 152/80 and has been elevated last couple times it has been checked.  We are going to increase her hydralazine to 25 mg t.i.d..  We are going to increase her losartan-hydrochlorothiazide to 100-25.  She was on 100-12.5.  She is also on propranolol    4.GERD- stable. Continue with ppi    5. Osteoarthritis- mainly her right knee bothering her now.  She has been seen by Orthopedics and recently had an injection.  We will refill her medicines.    6. Dyslipidemia-recent lipid panel done on March 10, 2023 with total cholesterol of 258, HDL 84 and .  Based on current statin guidelines a moderate intensity statin is recommended.    She is tolerating atorvastatin 10 mg.      Billing for this encounter based on moderate level of medical decision-making    She will f/u in 6 months or sooner if needed

## 2024-05-06 ENCOUNTER — CLINICAL SUPPORT (OUTPATIENT)
Dept: INTERNAL MEDICINE | Facility: CLINIC | Age: 69
End: 2024-05-06
Payer: MEDICARE

## 2024-05-06 VITALS — SYSTOLIC BLOOD PRESSURE: 138 MMHG | DIASTOLIC BLOOD PRESSURE: 82 MMHG | HEART RATE: 68 BPM

## 2024-05-06 DIAGNOSIS — I10 ESSENTIAL HYPERTENSION: Primary | ICD-10-CM

## 2024-05-06 PROCEDURE — 99213 OFFICE O/P EST LOW 20 MIN: CPT | Mod: PBBFAC

## 2024-05-06 NOTE — PROGRESS NOTES
Pt here for bp recheck. Pt is c/o severe pain in Right knee. She stated she is supposed to see ortho this month to possibly have fluid removed. Dr sandoval has been made aware of pts BP readings and is advising for pt to continue with current medications.

## 2024-05-15 ENCOUNTER — OFFICE VISIT (OUTPATIENT)
Dept: ORTHOPEDICS | Facility: CLINIC | Age: 69
End: 2024-05-15
Payer: MEDICARE

## 2024-05-15 DIAGNOSIS — M17.11 ARTHRITIS OF RIGHT KNEE: ICD-10-CM

## 2024-05-15 DIAGNOSIS — M17.11 PRIMARY OSTEOARTHRITIS OF RIGHT KNEE: Primary | ICD-10-CM

## 2024-05-15 PROCEDURE — 20610 DRAIN/INJ JOINT/BURSA W/O US: CPT | Mod: PBBFAC | Performed by: ORTHOPAEDIC SURGERY

## 2024-05-15 PROCEDURE — 99999PBSHW PR PBB SHADOW TECHNICAL ONLY FILED TO HB: Mod: PBBFAC,,,

## 2024-05-15 PROCEDURE — 99213 OFFICE O/P EST LOW 20 MIN: CPT | Mod: S$PBB,25,, | Performed by: ORTHOPAEDIC SURGERY

## 2024-05-15 PROCEDURE — 1159F MED LIST DOCD IN RCRD: CPT | Mod: CPTII,,, | Performed by: ORTHOPAEDIC SURGERY

## 2024-05-15 PROCEDURE — 99213 OFFICE O/P EST LOW 20 MIN: CPT | Mod: PBBFAC | Performed by: ORTHOPAEDIC SURGERY

## 2024-05-15 RX ORDER — TRIAMCINOLONE ACETONIDE 40 MG/ML
40 INJECTION, SUSPENSION INTRA-ARTICULAR; INTRAMUSCULAR
Status: DISCONTINUED | OUTPATIENT
Start: 2024-05-15 | End: 2024-05-15 | Stop reason: HOSPADM

## 2024-05-15 RX ORDER — BUPIVACAINE HYDROCHLORIDE 2.5 MG/ML
1 INJECTION, SOLUTION EPIDURAL; INFILTRATION; INTRACAUDAL
Status: DISCONTINUED | OUTPATIENT
Start: 2024-05-15 | End: 2024-05-15 | Stop reason: HOSPADM

## 2024-05-15 RX ADMIN — TRIAMCINOLONE ACETONIDE 40 MG: 40 INJECTION, SUSPENSION INTRA-ARTICULAR; INTRAMUSCULAR at 01:05

## 2024-05-15 RX ADMIN — BUPIVACAINE HYDROCHLORIDE 1 ML: 2.5 INJECTION, SOLUTION EPIDURAL; INFILTRATION; INTRACAUDAL at 01:05

## 2024-05-15 NOTE — PROCEDURES
Large Joint Aspiration/Injection: R knee    Date/Time: 5/15/2024 1:20 PM    Performed by: Kendall Whiteside MD  Authorized by: Kendall Whiteside MD    Consent Done?:  Yes (Verbal)  Indications:  Arthritis    Details:  Needle Size:  22 G  Ultrasonic Guidance for needle placement?: No    Approach:  Anterolateral  Location:  Knee  Site:  R knee  Medications:  1 mL BUPivacaine (PF) 0.25% (2.5 mg/ml) 0.25 % (2.5 mg/mL); 40 mg triamcinolone acetonide 40 mg/mL  Patient tolerance:  Patient tolerated the procedure well with no immediate complications

## 2024-05-15 NOTE — PROGRESS NOTES
Patient is here for right knee pain.  She has x-rays show she has degenerative changes more involved lateral compartment.  This time she has 1+ effusion.  Tender over medial and lateral joint lines.  No instability in the knee noted.  She was neurovascularly intact distally.  Lacking a few degrees of extension.  We discussed treatment options.  She has not getting full relief from her anti-inflammatories.  I injected her right knee with 1 cc Marcaine 1 cc Kenalog.  Let her weightbear as tolerates.  I will follow back up in 3 months.

## 2024-05-28 RX ORDER — PREGABALIN 50 MG/1
50 CAPSULE ORAL 2 TIMES DAILY
Qty: 60 CAPSULE | Refills: 5 | Status: SHIPPED | OUTPATIENT
Start: 2024-05-28

## 2024-05-31 RX ORDER — LOSARTAN POTASSIUM AND HYDROCHLOROTHIAZIDE 12.5; 1 MG/1; MG/1
1 TABLET ORAL
Qty: 90 TABLET | Refills: 3 | Status: SHIPPED | OUTPATIENT
Start: 2024-05-31

## 2024-06-07 NOTE — TELEPHONE ENCOUNTER
----- Message from Liudmila Chaudhary sent at 6/6/2024  4:20 PM CDT -----  Pt wants something called in for leg cramps and muscle spasms in legs and back. Uses [facility] [address]. Call [phone].  Who Called: Kash Julia    Caller is requesting assistance/information from provider's office.    Symptoms (please be specific): Leg cramps   How long has patient had these symptoms:  days  List of preferred pharmacies on file (remove unneeded): [facility]  If different, enter pharmacy into here including location and phone number:       Preferred Method of Contact: Phone Call  Patient's Preferred Phone Number on File: [phone]   Best Call Back Number, if different:  Additional Information:

## 2024-06-07 NOTE — TELEPHONE ENCOUNTER
Spoke to dr sandoval and he is advising for us to send in robaxin 750 mg q6 and gabapentin 100 mg qhs and if this ends up not helping to refer pt for pt.   I have called and informed pt of this. She would like for [facility]s to be sent to [facility] [address]

## 2024-06-07 NOTE — TELEPHONE ENCOUNTER
Spoke to pt and she is c/o muscle spasms in back, shoulders and legs x3 days. Denies injury. Has been using a heating pad and an ointment with no relief. She stated it is worse at night. I have informed pt I will discuss this with dr sandoval and give her a call back. Pt thanked me

## 2024-06-08 RX ORDER — GABAPENTIN 100 MG/1
100 CAPSULE ORAL NIGHTLY
Qty: 30 CAPSULE | Refills: 2 | Status: SHIPPED | OUTPATIENT
Start: 2024-06-08

## 2024-06-08 RX ORDER — METHOCARBAMOL 750 MG/1
750 TABLET, FILM COATED ORAL 4 TIMES DAILY
Qty: 40 TABLET | Refills: 0 | Status: SHIPPED | OUTPATIENT
Start: 2024-06-08 | End: 2024-06-18

## 2024-06-13 RX ORDER — PROPRANOLOL HYDROCHLORIDE 120 MG/1
120 CAPSULE, EXTENDED RELEASE ORAL DAILY
Qty: 30 CAPSULE | Refills: 0 | Status: SHIPPED | OUTPATIENT
Start: 2024-06-13 | End: 2024-06-18 | Stop reason: SDUPTHER

## 2024-06-13 RX ORDER — PROPRANOLOL HYDROCHLORIDE 120 MG/1
120 CAPSULE, EXTENDED RELEASE ORAL DAILY
Qty: 90 CAPSULE | Refills: 3 | OUTPATIENT
Start: 2024-06-13

## 2024-06-18 RX ORDER — PROPRANOLOL HYDROCHLORIDE 120 MG/1
120 CAPSULE, EXTENDED RELEASE ORAL DAILY
Qty: 90 CAPSULE | Refills: 3 | Status: SHIPPED | OUTPATIENT
Start: 2024-06-18 | End: 2025-06-18

## 2024-06-18 NOTE — TELEPHONE ENCOUNTER
----- Message from Jelena Dorsey LPN sent at 6/13/2024 11:10 AM CDT -----  Send in propanolol for pt

## 2024-07-01 RX ORDER — METHOCARBAMOL 750 MG/1
750 TABLET, FILM COATED ORAL 4 TIMES DAILY
Qty: 40 TABLET | Refills: 0 | Status: SHIPPED | OUTPATIENT
Start: 2024-07-01 | End: 2024-07-11

## 2024-07-09 DIAGNOSIS — Z71.89 COMPLEX CARE COORDINATION: ICD-10-CM

## 2024-08-05 ENCOUNTER — PATIENT MESSAGE (OUTPATIENT)
Dept: ADMINISTRATIVE | Facility: HOSPITAL | Age: 69
End: 2024-08-05

## 2024-08-06 ENCOUNTER — PATIENT OUTREACH (OUTPATIENT)
Facility: HOSPITAL | Age: 69
End: 2024-08-06
Payer: MEDICARE

## 2024-08-06 DIAGNOSIS — M25.551 RIGHT HIP PAIN: Primary | ICD-10-CM

## 2024-08-06 DIAGNOSIS — M54.9 BACK PAIN, UNSPECIFIED BACK LOCATION, UNSPECIFIED BACK PAIN LATERALITY, UNSPECIFIED CHRONICITY: ICD-10-CM

## 2024-08-06 DIAGNOSIS — M25.569 KNEE PAIN, UNSPECIFIED CHRONICITY, UNSPECIFIED LATERALITY: ICD-10-CM

## 2024-08-06 DIAGNOSIS — Z86.010 HISTORY OF COLON POLYPS: Primary | ICD-10-CM

## 2024-08-09 RX ORDER — METHOCARBAMOL 750 MG/1
750 TABLET, FILM COATED ORAL 4 TIMES DAILY PRN
Qty: 40 TABLET | Refills: 1 | Status: SHIPPED | OUTPATIENT
Start: 2024-08-09

## 2024-08-26 ENCOUNTER — CLINICAL SUPPORT (OUTPATIENT)
Dept: REHABILITATION | Facility: HOSPITAL | Age: 69
End: 2024-08-26
Attending: INTERNAL MEDICINE
Payer: MEDICARE

## 2024-08-26 DIAGNOSIS — M25.561 CHRONIC PAIN OF RIGHT KNEE: Primary | ICD-10-CM

## 2024-08-26 DIAGNOSIS — M25.552 LEFT HIP PAIN: ICD-10-CM

## 2024-08-26 DIAGNOSIS — G89.29 CHRONIC PAIN OF RIGHT KNEE: Primary | ICD-10-CM

## 2024-08-26 DIAGNOSIS — M54.9 BACK PAIN, UNSPECIFIED BACK LOCATION, UNSPECIFIED BACK PAIN LATERALITY, UNSPECIFIED CHRONICITY: ICD-10-CM

## 2024-08-26 DIAGNOSIS — M25.569 KNEE PAIN, UNSPECIFIED CHRONICITY, UNSPECIFIED LATERALITY: ICD-10-CM

## 2024-08-26 DIAGNOSIS — M25.551 RIGHT HIP PAIN: ICD-10-CM

## 2024-08-26 PROCEDURE — 97162 PT EVAL MOD COMPLEX 30 MIN: CPT

## 2024-08-26 NOTE — PLAN OF CARE
[facility]   Physical Therapy Initial Evaluation      Name: Kash Dumont  [facility] Number: [id number]    Therapy Diagnosis:   Encounter Diagnoses   Name Primary?    Right hip pain     Back pain, unspecified back location, unspecified back pain laterality, unspecified chronicity     Knee pain, unspecified chronicity, unspecified laterality     Chronic pain of right knee Yes    Left hip pain         Physician: Ollie Napoles DO    Physician Orders: PT Eval and Treat   Medical Diagnosis from Referral: Right Knee and Left Hip Pain   Evaluation Date: 8/26/2024  Authorization Period Expiration: [employer] Man[employer]   Plan of Care Expiration: 10/25/2024   Visit # / Visits authorized: 1/ [employer] Man[employer]    FOTO: 29/100    Precautions: Standard     Time In: 1:20 pm   Time Out: 2:04 pm  Total Appointment Time (timed & untimed codes): 44 minutes    Subjective     Date of onset: 5/1/2024    History of current condition - Kash reports: she has had Right Knee pain for about a year. She was seen by Dr Whiteside on 5/15/2024. Knee was swollen at that time. MD tried to remove fluid but could not. Patient opted for steroid injection in the Right Knee. This has been approximately 3 months ago and now the steroid has worn off. Right knee is very painful especially with weight bearing. Patient has been trying to offload weight from the Right leg on to the Left and this has now caused Left Hip pain and dysfunction. Patient saw Dr Trevon Napoles this month. Dr Napoles ordered Outpatient Physical Therapy and patient is here today for the Evaluation.        Patient was seen by Dr Whiteside here at our [facility] at [facility] on 5/15/2024. MD note states in part :    Patient is here for right knee pain.  She has x-rays show she has degenerative changes more involved lateral compartment.  This time she has 1+ effusion.  Tender over medial and lateral joint lines.  No instability in the knee noted.  She was  "neurovascularly intact distally.  Lacking a few degrees of extension.  We discussed treatment options.  She has not getting full relief from her anti-inflammatories.  I injected her right knee with 1 cc Marcaine 1 cc Kenalog.  Let her weightbear as tolerates.  I will follow back up in 3 months.    Falls: None     Imaging: X Rays but no MRI :   X Rays Right Knee :   FINDINGS:  Mild tricompartmental osteoarthritic changes with osteophyte formation are seen.  There is no fracture or dislocation.  There is a moderate suprapatellar joint effusion.     Impression:     Joint effusion and degenerative change.  No acute fracture or dislocation.      X Rays Left Hip :   FINDINGS:  No evidence of fracture seen.  The alignment of the joints appears normal.  Mild-to-moderate degenerative change is present.  No soft tissue abnormality is seen.  Impression:  Mild-to-moderate hip osteoarthrosis.    Prior Therapy: None   Social History:  lives with their family  Occupation: Retired from housekeeping here at [facility]   Prior Level of Function: Independent and Active   Current Level of Function: Right Knee and Left Hip pain limits all of her mobility; She has difficulty with ADLs and activities due to pain.    Pain:  Current 9/10, worst 10/10, best 8/10 Right Knee; Current 8/10, worst 10/10, best 7/10  Location: Right Knee and Left Hip     Description: Aching, Dull, and Sharp  Aggravating Factors: Standing and Walking especially after being still too long; moving the Left Hip and the Right Knee  Easing Factors: relaxation, pain medication, heating pad, hot bath, and rest; Voltaren Gel     Patients goals: "I want to get myself back walking."     Medical History:   Past Medical History:   Diagnosis Date    Acute sinusitis     Asthma     Breast cancer     Cough     Dyspnea     History of breast cancer     Hyperplastic rectal polyp 10/02/2019    See October 2019 Colonoscopy - Dr. [facility]    Hypertension     Personal history of colonic " polyps     Pulmonary nodule        Surgical History:   Kash Dumont  has a past surgical history that includes Colonoscopy (10/02/2019); Tubal ligation; Hysterectomy; Oophorectomy; and Mastectomy (Right).    Medications:   Kash has a current medication list which includes the following prescription(s): advair diskus, albuterol, atorvastatin, celecoxib, diclofenac sodium, gabapentin, hydralazine, losartan-hydrochlorothiazide 100-12.5 mg, losartan-hydrochlorothiazide 100-25 mg, methocarbamol, montelukast, pregabalin, propranolol, restasis, and zolpidem.    Allergies:   Review of patient's allergies indicates:  No Known Allergies     Objective          Observation :     Pronation/Supination : Right Over Pronation - significant genu valgus                                           Left Over Pronation     Girth Measurements :      Right Lower Extremity :  Mid Patella 49  cm        Left Lower Extremity :  Mid Patella 47  cm       Comments : significant Right Knee Genu Valgus that has just started this year.       Range of Motion/Strength :                  Left Extremity                                                                        Right Extremity   AROM PROM Strength  Location  AROM    PROM   Strength    90   3/5   Hip      Flexion (140)  110   4/5    10                 Extension (10)  15      20                 Internal Rotation (40)  30      30                 External Rotation (50)  45      30                 Abduction (45)  45      neutral                 Adduction (30)  15      130   5/5   Knee    Flexion (140)  90  95  3/5    0                   Extension (0)  -10  -8     Full    5/5   Ankle   Dorsiflexion (20)  Full    5/5                     Plantar Flexion (50)                        Inversion (35)                        Eversion (25)                 Knee Special Tests :     B. Knee  Lochman's test: right Negative left Negative  Anterior drawer: right Negative left Negative  Posterior drawer: right  Negative left Negative  Varus stress test: right Positive left Negative  Valgus stress test: right Positive left Negative  PFJ grind test: right Positive left Negative  McMurrays: right Positive left Negative    Functional Impairments :  Patient has difficulty with sit to stand secondary to pain in bilateral legs. During ambulation, patient noted to have significant genu valgus of the Right Knee. She has decreased stance time and antalgic gait on the Right. However, Left Hip is also painful with weight bearing and so stance time is limited on the left as well.  Patient has significantly decreased step/stride length resulting in slow caron.       Limitation/Restriction for FOTO Intake Knee Survey    Therapist reviewed FOTO scores for Kash Dumont on 8/26/2024.   FOTO documents entered into [employer] - see Media section.    Limitation Score: 71%             Patient Education     Education provided:   - Discussed the findings from the Evaluation and Reviewed the Plan of Care.    Assessment     Kash is a 69 y.o. female referred to outpatient Physical Therapy with a medical diagnosis of Right Knee and Left Hip Pain. Kash reports: she has had Right Knee pain for about a year. She was seen by Dr Whiteside on 5/15/2024. Knee was swollen at that time. MD tried to remove fluid but could not. Patient opted for steroid injection in the Right Knee. This has been approximately 3 months ago and now the steroid has worn off. Right knee is very painful especially with weight bearing. Patient has been trying to offload weight from the Right leg on to the Left and this has now caused Left Hip pain and dysfunction. Patient saw Dr Trevon Napoles this month. Dr Napoles ordered Outpatient Physical Therapy and patient is here today for the Evaluation.      Patient presents with decreased Range of Motion in Right Knee and Left Hip. She has reduced strength in both of these as well. X Rays show degenerative changes in the right knee and the left  hip with knee worse than hip. Patient has difficulty with sit to stand secondary to pain in bilateral legs. During ambulation, patient noted to have significant genu valgus of the Right Knee. She has decreased stance time and antalgic gait on the Right. However, Left Hip is also painful with weight bearing and so stance time is limited on the left as well.  Patient has significantly decreased step/stride length resulting in slow caron.   Patient will require Physical Therapy Intervention to address all deficits and work toward completion of all goals set. Therapist will refer patient back to MD upon completion of Therapy for further assessment and possible MRI if pain and dysfunction persist. Feel she will likely require Right TKA in the future due to the structural changes and genu valgus that was noted today upon the Evaluation.     Patient prognosis is Guarded.   Patient will benefit from skilled outpatient Physical Therapy to address the deficits stated above and in the chart below, provide patient /family education, and to maximize patientt's level of independence.     Plan of care discussed with patient: Yes  Patient's spiritual, cultural and educational needs considered and patient is agreeable to the plan of care and goals as stated below:     Anticipated Barriers for therapy: Patient will likely need TKA in the future    Medical Necessity is demonstrated by the following  History  Co-morbidities and personal factors that may impact the plan of care [] LOW: no personal factors / co-morbidities  [] MODERATE: 1-2 personal factors / co-morbidities  [x] HIGH: 3+ personal factors / co-morbidities    Moderate / High Support Documentation:   Co-morbidities affecting plan of care:   Past Medical History:   Diagnosis Date    Acute sinusitis     Asthma     Breast cancer     Cough     Dyspnea     History of breast cancer     Hyperplastic rectal polyp 10/02/2019    See October 2019 Colonoscopy - Dr. Larios     Hypertension     Personal history of colonic polyps     Pulmonary nodule        has a past surgical history that includes Colonoscopy (10/02/2019); Tubal ligation; Hysterectomy; Oophorectomy; and Mastectomy (Right).  Personal Factors:   no deficits     Examination  Body Structures and Functions, activity limitations and participation restrictions that may impact the plan of care [] LOW: addressing 1-2 elements  [] MODERATE: 3+ elements  [x] HIGH: 4+ elements (please support below)    Moderate / High Support Documentation: decreased Range of Motion in Right Knee and Left Hip. She has reduced strength in both of these as well. X Rays show degenerative changes in the right knee and the left hip with knee worse than hip. Patient has difficulty with sit to stand secondary to pain in bilateral legs. During ambulation, patient noted to have significant genu valgus of the Right Knee. She has decreased stance time and antalgic gait on the Right. However, Left Hip is also painful with weight bearing and so stance time is limited on the left as well.  Patient has significantly decreased step/stride length resulting in slow caron.      Clinical Presentation [] LOW: stable  [x] MODERATE: Evolving - Will likely require additional testing at the completion of Physical Therapy to determine the exact cause of patient's pain and dysfunction    [] HIGH: Unstable     Decision Making/ Complexity Score: moderate       Goals:  Short Term Goals: 4 weeks   1. Independent with Home Exercise Program   2. Increase Right Knee Range of Motion to 0 Degrees to 120 Degrees  3. Increase Right Knee Strength to grossly 4/5  4. Increase Left Hip Range of Motion to Within Functional Limits   5. Increase Left Hip Strength to grossly 4/5  6. Patient will ambulate 500 feet with stance time approximately equal from Right to Left and with complaints of pain Less than or Equal to 4/10.          Long Term Goals: 8 weeks   1. Patient will increase Right Knee  and Left Hip Strength to grossly 4+/5  2. Patient will ambulate 1000+ feet with stance time equal from Right to Left and with complaints of pain Less than or Equal to 3/10.        Plan     Plan of care Certification: 8/26/2024 to 10/25/2024.    Outpatient Physical Therapy 2 times weekly for 8 weeks to include the following interventions: 86613 [therapeutic exercise], 08819 [neuromuscular re-education], 70675 [gait training], 94766 [manual therapy], 90520 [therapeutic activities], 18288 [unattended electrical stimulation], and 48239 [vasopneumatic device]    BUNNY GONSALVES, PT, DPT     Clinical Information for Insurance Authorization     Dates of Services:  8/26/2024 to 10/25/2024   Discharge Plan: Patient will be discharged from skilled physical therapy treatment once all goals have been met, patient has plateaued, or physician/insurance requests discontinuation of care. Patient will be discharged with a home exercise program.   Type of therapy: Rehabilitative  ICD-10 Diagnosis Code(s): M25.561  Which side is symptomatic? Right  Surgical: No  Surgical procedure: N/A  Surgery date: N/A.  Presenting symptoms/diagnoses are repetitive in nature.  Presenting symptoms are non-radiating in nature.   The rehabilitation is not related to a diagnosis of cancer.  The rehabilitation is not related to a diagnosis of lymphedema.  Patient's clinical presentation is:  Severe objective and functional deficits: consistent intense symptoms with severe loss of range of motion, strength, or ability to perform daily tasks  CPT Codes Requested:  83579 [therapeutic exercise], 33286 [neuromuscular re-education], 59406 [gait training], 02574 [manual therapy], 25440 [therapeutic activities], 10640 [unattended electrical stimulation], and 19151 [vasopneumatic device]

## 2024-08-26 NOTE — PROGRESS NOTES
See Plan of Care     Sup Visit performed today with AUREA Trivedi and AUREA Hall.  All goals and treatment plan reviewed. Will work toward completion of all goals set.    We will be treating Right Knee and Left Hip     First Treatment May Include :     Bike   SB  Hamstring Stretch on Stairs   Knee Flexion Stretch on Stairs     Supine :   Quad Sets  Heel Slides   Short Arc Quads   Straight Leg Raises   Hip Abduction   Bridging     Sitting :  Marching   LAQs  Hamstring Curls   Hip Abd/Add  Ankle Pumps     Standing :  Standing Marches  Squat to chair/Shallow standing knee bends  Hip Abduction   Heel Raises     Single Leg Stance - Firm   Single Leg Stance - Foam

## 2024-09-09 ENCOUNTER — CLINICAL SUPPORT (OUTPATIENT)
Dept: REHABILITATION | Facility: HOSPITAL | Age: 69
End: 2024-09-09
Payer: MEDICARE

## 2024-09-09 DIAGNOSIS — G89.29 CHRONIC PAIN OF RIGHT KNEE: Primary | ICD-10-CM

## 2024-09-09 DIAGNOSIS — M25.561 CHRONIC PAIN OF RIGHT KNEE: Primary | ICD-10-CM

## 2024-09-09 DIAGNOSIS — M25.552 LEFT HIP PAIN: ICD-10-CM

## 2024-09-09 PROCEDURE — 97110 THERAPEUTIC EXERCISES: CPT

## 2024-09-09 PROCEDURE — 97530 THERAPEUTIC ACTIVITIES: CPT

## 2024-09-09 NOTE — PROGRESS NOTES
[facility]   Physical Therapy Treatment Note     Name: Kash Dumont  Clinic Number: [id number]    Therapy Diagnosis:   Encounter Diagnoses   Name Primary?    Chronic pain of right knee Yes    Left hip pain      Physician: Ollie Napoles DO    Visit Date: 9/9/2024    Physician Orders: PT Eval and Treat   Medical Diagnosis from Referral: Right Knee and Left Hip Pain   Evaluation Date: 8/26/2024  Authorization Period Expiration: [employer] Man[employer]   Plan of Care Expiration: 10/25/2024   Visit # / Visits authorized: 2/ [employer] Man[employer]    FOTO: 29/100    PTA Visit #: 0/5    Time In: 10:48 am   Time Out: 11:32 am   Total Billable Time: 44 minutes    Precautions: Standard  Functional Level at time of Evaluation: Right Knee and Left Hip pain limits all of her mobility; She has difficulty with ADLs and activities due to pain.    Subjective     Pt reports: Her Right knee is what is really hurting today.  She was compliant with home exercise program.    Pain: 7/10 Left Hip and 8/10 Right Knee  Location: right knee and Left Hip     Objective       Kash received therapeutic exercises to develop strength, endurance, ROM, and flexibility for 30 minutes including:    NuStep x 5 Min    SB 4 x 15 sec   Hamstring Stretch on Stairs 4 x 15 sec   Knee Flexion Stretch on Stairs      Supine :   Quad Sets x 10   Heel Slides x 10   Short Arc Quads x 10    Straight Leg Raises x 10   Hip Abduction x 10   Bridging x 10      Sitting :  Marching x 10   LAQs x 10   Hamstring Curls   Hip Abd/Add  Ankle Pumps             Kash participated in neuromuscular re-education activities to improve: Balance, Coordination, Kinesthetic, Sense, and Proprioception for 0 minutes. The following activities were included:    Single Leg Stance - Firm   Single Leg Stance - Foam     Kash participated in dynamic functional therapeutic activities to improve functional performance for 14  minutes, including:    Standing :  Standing  Marches x 10   Squat to chair/Shallow standing knee bends x 5   Hip Abduction x 5   Heel Raises            Home Exercises Provided and Patient Education Provided     Education provided: Therapist initiated patient's Home Exercise Program today. Patient was given a written copy of the Home Exercise Program with pictures and written instructions for each exercise.  Instructed patient on proper form for all exercises and had patient return demonstration.       Written Home Exercises Provided: yes.  Exercises were reviewed and Kash was able to demonstrate them prior to the end of the session.  Kash demonstrated fair  understanding of the education provided.     See EMR under Patient Instructions for exercises provided 9/9/2024.    Assessment     Today is the patient's first treatment since the Evaluation. Therapist initiated the Plan of Care and instructed patient on proper form for all exercises. We are working on both the Right Knee and the Left Hip. Pain limits all of her mobility.   Therapist initiated patient's Home Exercise Program today. Patient was given a written copy of the Home Exercise Program with pictures and written instructions for each exercise.  Patient has a fair understanding of the exercises but will require further instruction and practice as she required verbal as well as physical cues to perform correctly.   Patient fatigues very easily and requires rest breaks, but she worked hard in therapy today. We plan to review her Home Exercise Program at her next visit to make sure she understands how to perform each one correctly. We plan to progress her as tolerated.   Sup Visit performed today with AUREA Fofana, AUREA Trivedi, and AUREA Hall.  All goals and treatment plan reviewed. Will work toward completion of all goals set.            PMH at time of Evaluation :   Kash is a 69 y.o. female referred to outpatient Physical Therapy with a medical diagnosis of Right Knee and Left  Hip Pain. Kash reports: she has had Right Knee pain for about a year. She was seen by Dr Whiteside on 5/15/2024. Knee was swollen at that time. MD tried to remove fluid but could not. Patient opted for steroid injection in the Right Knee. This has been approximately 3 months ago and now the steroid has worn off. Right knee is very painful especially with weight bearing. Patient has been trying to offload weight from the Right leg on to the Left and this has now caused Left Hip pain and dysfunction. Patient saw Dr Trevon Napoles this month. Dr Napoles ordered Outpatient Physical Therapy and patient is here today for the Evaluation.      Patient presents with decreased Range of Motion in Right Knee and Left Hip. She has reduced strength in both of these as well. X Rays show degenerative changes in the right knee and the left hip with knee worse than hip. Patient has difficulty with sit to stand secondary to pain in bilateral legs. During ambulation, patient noted to have significant genu valgus of the Right Knee. She has decreased stance time and antalgic gait on the Right. However, Left Hip is also painful with weight bearing and so stance time is limited on the left as well.  Patient has significantly decreased step/stride length resulting in slow caron.   Patient will require Physical Therapy Intervention to address all deficits and work toward completion of all goals set. Therapist will refer patient back to MD upon completion of Therapy for further assessment and possible MRI if pain and dysfunction persist. Feel she will likely require Right TKA in the future due to the structural changes and genu valgus that was noted today upon the Evaluation.       Kash Is progressing well towards her goals.   Pt prognosis is Good.     Pt will continue to benefit from skilled outpatient physical therapy to address the deficits listed in the problem list box on initial evaluation, provide pt/family education and to maximize pt's  level of independence in the home and community environment.     Pt's spiritual, cultural and educational needs considered and pt agreeable to plan of care and goals.     Anticipated Barriers for therapy: Patient will likely need TKA in the future    Goals:  Short Term Goals: 4 weeks   1. Independent with Home Exercise Program   2. Increase Right Knee Range of Motion to 0 Degrees to 120 Degrees  3. Increase Right Knee Strength to grossly 4/5  4. Increase Left Hip Range of Motion to Within Functional Limits   5. Increase Left Hip Strength to grossly 4/5  6. Patient will ambulate 500 feet with stance time approximately equal from Right to Left and with complaints of pain Less than or Equal to 4/10.             Long Term Goals: 8 weeks   1. Patient will increase Right Knee and Left Hip Strength to grossly 4+/5  2. Patient will ambulate 1000+ feet with stance time equal from Right to Left and with complaints of pain Less than or Equal to 3/10.     Plan     Plan of care Certification: 8/26/2024 to 10/25/2024.     Outpatient Physical Therapy 2 times weekly for 8 weeks to include the following interventions: 59580 [therapeutic exercise], 83041 [neuromuscular re-education], 97229 [gait training], 99023 [manual therapy], 20714 [therapeutic activities], 04951 [unattended electrical stimulation], and 61035 [vasopneumatic device]    BUNNY GONSALVES, PT, DPT   9/9/2024

## 2024-09-16 ENCOUNTER — CLINICAL SUPPORT (OUTPATIENT)
Dept: REHABILITATION | Facility: HOSPITAL | Age: 69
End: 2024-09-16
Payer: MEDICARE

## 2024-09-16 DIAGNOSIS — M25.552 LEFT HIP PAIN: ICD-10-CM

## 2024-09-16 DIAGNOSIS — M25.561 CHRONIC PAIN OF RIGHT KNEE: Primary | ICD-10-CM

## 2024-09-16 DIAGNOSIS — G89.29 CHRONIC PAIN OF RIGHT KNEE: Primary | ICD-10-CM

## 2024-09-16 PROCEDURE — 97110 THERAPEUTIC EXERCISES: CPT

## 2024-09-16 PROCEDURE — 97530 THERAPEUTIC ACTIVITIES: CPT

## 2024-09-16 NOTE — PROGRESS NOTES
[facility]   Physical Therapy Treatment Note     Name: Kash Dumont  Clinic Number: [id number]    Therapy Diagnosis:   No diagnosis found.    Physician: Ollie Napoles DO    Visit Date: 9/16/2024    Physician Orders: PT Eval and Treat   Medical Diagnosis from Referral: Right Knee and Left Hip Pain   Evaluation Date: 8/26/2024  Authorization Period Expiration: [employer] Man[employer]   Plan of Care Expiration: 10/25/2024   Visit # / Visits authorized: 2/ [employer]    FOTO: 29/100    PTA Visit #: 0/5    Time In: 10:48 am   Time Out: 11:32 am   Total Billable Time: 44 minutes    Precautions: Standard  Functional Level at time of Evaluation: Right Knee and Left Hip pain limits all of her mobility; She has difficulty with ADLs and activities due to pain.    Subjective     Pt reports: Her Right knee is what is really hurting today.  She was compliant with home exercise program.    Pain: 7/10 Left Hip and 8/10 Right Knee  Location: right knee and Left Hip     Objective       Kash received therapeutic exercises to develop strength, endurance, ROM, and flexibility for 30 minutes including:    NuStep x 5 Min    SB 4 x 15 sec   Hamstring Stretch on Stairs 4 x 15 sec   Knee Flexion Stretch on Stairs      Supine :   Quad Sets x 10   Heel Slides x 10   Short Arc Quads x 10    Straight Leg Raises x 10   Hip Abduction x 10   Bridging x 10      Sitting :  Marching x 10   LAQs x 10   Hamstring Curls   Hip Abd/Add  Ankle Pumps             Kash participated in neuromuscular re-education activities to improve: Balance, Coordination, Kinesthetic, Sense, and Proprioception for 0 minutes. The following activities were included:    Single Leg Stance - Firm   Single Leg Stance - Foam     Kash participated in dynamic functional therapeutic activities to improve functional performance for 14  minutes, including:    Standing :  Standing Marches x 10   Squat to chair/Shallow standing knee bends x 5   Hip  Abduction x 5   Heel Raises            Home Exercises Provided and Patient Education Provided     Education provided: Therapist initiated patient's Home Exercise Program today. Patient was given a written copy of the Home Exercise Program with pictures and written instructions for each exercise.  Instructed patient on proper form for all exercises and had patient return demonstration.       Written Home Exercises Provided: yes.  Exercises were reviewed and Kash was able to demonstrate them prior to the end of the session.  Kash demonstrated fair  understanding of the education provided.     See EMR under Patient Instructions for exercises provided 9/9/2024.    Assessment     Today is the patient's first treatment since the Evaluation. Therapist initiated the Plan of Care and instructed patient on proper form for all exercises. We are working on both the Right Knee and the Left Hip. Pain limits all of her mobility.   Therapist initiated patient's Home Exercise Program today. Patient was given a written copy of the Home Exercise Program with pictures and written instructions for each exercise.  Patient has a fair understanding of the exercises but will require further instruction and practice as she required verbal as well as physical cues to perform correctly.   Patient fatigues very easily and requires rest breaks, but she worked hard in therapy today. We plan to review her Home Exercise Program at her next visit to make sure she understands how to perform each one correctly. We plan to progress her as tolerated.   Sup Visit performed today with [facility] Fofana, [facility] Trivedi, and [facility] Hall.  All goals and treatment plan reviewed. Will work toward completion of all goals set.            PMH at time of Evaluation :   Kash is a 69 y.o. female referred to outpatient Physical Therapy with a medical diagnosis of Right Knee and Left Hip Pain. Kash reports: she has had Right Knee pain for about a  year. She was seen by Dr Whiteside on 5/15/2024. Knee was swollen at that time. MD tried to remove fluid but could not. Patient opted for steroid injection in the Right Knee. This has been approximately 3 months ago and now the steroid has worn off. Right knee is very painful especially with weight bearing. Patient has been trying to offload weight from the Right leg on to the Left and this has now caused Left Hip pain and dysfunction. Patient saw Dr Trevon Napoles this month. Dr Napoles ordered Outpatient Physical Therapy and patient is here today for the Evaluation.      Patient presents with decreased Range of Motion in Right Knee and Left Hip. She has reduced strength in both of these as well. X Rays show degenerative changes in the right knee and the left hip with knee worse than hip. Patient has difficulty with sit to stand secondary to pain in bilateral legs. During ambulation, patient noted to have significant genu valgus of the Right Knee. She has decreased stance time and antalgic gait on the Right. However, Left Hip is also painful with weight bearing and so stance time is limited on the left as well.  Patient has significantly decreased step/stride length resulting in slow caron.   Patient will require Physical Therapy Intervention to address all deficits and work toward completion of all goals set. Therapist will refer patient back to MD upon completion of Therapy for further assessment and possible MRI if pain and dysfunction persist. Feel she will likely require Right TKA in the future due to the structural changes and genu valgus that was noted today upon the Evaluation.       Kash Is progressing well towards her goals.   Pt prognosis is Good.     Pt will continue to benefit from skilled outpatient physical therapy to address the deficits listed in the problem list box on initial evaluation, provide pt/family education and to maximize pt's level of independence in the home and community environment.      Pt's spiritual, cultural and educational needs considered and pt agreeable to plan of care and goals.     Anticipated Barriers for therapy: Patient will likely need TKA in the future    Goals:  Short Term Goals: 4 weeks   1. Independent with Home Exercise Program   2. Increase Right Knee Range of Motion to 0 Degrees to 120 Degrees  3. Increase Right Knee Strength to grossly 4/5  4. Increase Left Hip Range of Motion to Within Functional Limits   5. Increase Left Hip Strength to grossly 4/5  6. Patient will ambulate 500 feet with stance time approximately equal from Right to Left and with complaints of pain Less than or Equal to 4/10.             Long Term Goals: 8 weeks   1. Patient will increase Right Knee and Left Hip Strength to grossly 4+/5  2. Patient will ambulate 1000+ feet with stance time equal from Right to Left and with complaints of pain Less than or Equal to 3/10.     Plan     Plan of care Certification: 8/26/2024 to 10/25/2024.     Outpatient Physical Therapy 2 times weekly for 8 weeks to include the following interventions: 24138 [therapeutic exercise], 54741 [neuromuscular re-education], 24396 [gait training], 23604 [manual therapy], 46209 [therapeutic activities], 15337 [unattended electrical stimulation], and 16817 [vasopneumatic device]    Jacky Reyes PTA  9/16/2024

## 2024-09-16 NOTE — PROGRESS NOTES
[facility]   Physical Therapy Treatment Note     Name: Kash Dumont  Clinic Number: [id number]    Therapy Diagnosis:   Encounter Diagnoses   Name Primary?    Chronic pain of right knee Yes    Left hip pain        Physician: Ollie Napoles DO    Visit Date: 9/16/2024    Physician Orders: PT Eval and Treat   Medical Diagnosis from Referral: Right Knee and Left Hip Pain   Evaluation Date: 8/26/2024  Authorization Period Expiration: [employer] Man[employer]   Plan of Care Expiration: 10/25/2024   Visit # / Visits authorized: 3/ [employer]    FOTO: 29/100    PTA Visit #: 0/5    Time In: 9:51 am   Time Out: 10:45 am   Total Billable Time: 54 minutes    Precautions: Standard  Functional Level at time of Evaluation: Right Knee and Left Hip pain limits all of her mobility; She has difficulty with ADLs and activities due to pain.    Subjective     Pt reports: Her Right knee is the main problem today.  She was compliant with home exercise program.    Pain: 3/10 Left Hip and 6/10 Right Knee  Location: right knee and Left Hip     Objective       Kash received therapeutic exercises to develop strength, endurance, ROM, and flexibility for 30 minutes including:    NuStep x 5 Min    SB 4 x 15 sec   Hamstring Stretch on Stairs 4 x 15 sec   Knee Flexion Stretch on Stairs      Supine :   Quad Sets 2 x 10   Heel Slides x 10   Short Arc Quads x 10    Straight Leg Raises x 10   Hip Abduction x 10   Bridging x 10      Sitting :  Marching 2 x 10   LAQs 2 x 10   Hamstring Curls 2 x 10  with green theraband (added 9/16)  Hip Abd/Add 2 x 10  with green theraband (added 9/16)  Ankle Pumps             Kash participated in neuromuscular re-education activities to improve: Balance, Coordination, Kinesthetic, Sense, and Proprioception for 0 minutes. The following activities were included:    Single Leg Stance - Firm   Single Leg Stance - Foam     Kash participated in dynamic functional therapeutic activities  to improve functional performance for 24  minutes, including:    Standing :  Standing Marches x 10   Squat to chair/Shallow standing knee bends 2 x 5   Hip Abduction x 10  Hip Extension x 10 (added 9/16)  Heel Raises 2 x 10  (added 9/16)  Forward Step Ups 4 inch x 10 bilateral  (added 9/16)           Home Exercises Provided and Patient Education Provided     Education provided: Therapist initiated patient's Home Exercise Program today. Patient was given a written copy of the Home Exercise Program with pictures and written instructions for each exercise.  Instructed patient on proper form for all exercises and had patient return demonstration.       Written Home Exercises Provided: yes.  Exercises were reviewed and Kash was able to demonstrate them prior to the end of the session.  Kash demonstrated fair  understanding of the education provided.     See EMR under Patient Instructions for exercises provided 9/9/2024.    Assessment     Patient was given the Home Exercise Program at the last visit. She reports she did try these over the past week. Therapist reviewed these with her again today. She did well with all of these exercises. Therapist added several exercises today as noted above. Therapist increased the number of reps on all of the exercises. She did very well with this today. Patient reports her Right Knee is the main thing that is hurting today. Her overall mobility remains limited due to pain in Left Hip and Right Knee, but she works very hard in Therapy. Therapist gave her a green theraband for use at home with her Home Exercise Program so she can add resistance when able. We will continue to progress her as tolerated.   Sup Visit performed today with [facility] Fofana, [facility] Trivedi, and [facility] Hall.  All goals and treatment plan reviewed. Will work toward completion of all goals set.            PMH at time of Evaluation :   Kash is a 69 y.o. female referred to outpatient Physical  Therapy with a medical diagnosis of Right Knee and Left Hip Pain. Kash reports: she has had Right Knee pain for about a year. She was seen by Dr Whiteside on 5/15/2024. Knee was swollen at that time. MD tried to remove fluid but could not. Patient opted for steroid injection in the Right Knee. This has been approximately 3 months ago and now the steroid has worn off. Right knee is very painful especially with weight bearing. Patient has been trying to offload weight from the Right leg on to the Left and this has now caused Left Hip pain and dysfunction. Patient saw Dr Trevon Napoles this month. Dr Napoles ordered Outpatient Physical Therapy and patient is here today for the Evaluation.      Patient presents with decreased Range of Motion in Right Knee and Left Hip. She has reduced strength in both of these as well. X Rays show degenerative changes in the right knee and the left hip with knee worse than hip. Patient has difficulty with sit to stand secondary to pain in bilateral legs. During ambulation, patient noted to have significant genu valgus of the Right Knee. She has decreased stance time and antalgic gait on the Right. However, Left Hip is also painful with weight bearing and so stance time is limited on the left as well.  Patient has significantly decreased step/stride length resulting in slow caron.   Patient will require Physical Therapy Intervention to address all deficits and work toward completion of all goals set. Therapist will refer patient back to MD upon completion of Therapy for further assessment and possible MRI if pain and dysfunction persist. Feel she will likely require Right TKA in the future due to the structural changes and genu valgus that was noted today upon the Evaluation.       Kash Is progressing well towards her goals.   Pt prognosis is Good.     Pt will continue to benefit from skilled outpatient physical therapy to address the deficits listed in the problem list box on initial  evaluation, provide pt/family education and to maximize pt's level of independence in the home and community environment.     Pt's spiritual, cultural and educational needs considered and pt agreeable to plan of care and goals.     Anticipated Barriers for therapy: Patient will likely need TKA in the future    Goals:  Short Term Goals: 4 weeks   1. Independent with Home Exercise Program   2. Increase Right Knee Range of Motion to 0 Degrees to 120 Degrees  3. Increase Right Knee Strength to grossly 4/5  4. Increase Left Hip Range of Motion to Within Functional Limits   5. Increase Left Hip Strength to grossly 4/5  6. Patient will ambulate 500 feet with stance time approximately equal from Right to Left and with complaints of pain Less than or Equal to 4/10.             Long Term Goals: 8 weeks   1. Patient will increase Right Knee and Left Hip Strength to grossly 4+/5  2. Patient will ambulate 1000+ feet with stance time equal from Right to Left and with complaints of pain Less than or Equal to 3/10.     Plan     Plan of care Certification: 8/26/2024 to 10/25/2024.     Outpatient Physical Therapy 2 times weekly for 8 weeks to include the following interventions: 01000 [therapeutic exercise], 24137 [neuromuscular re-education], 22055 [gait training], 49921 [manual therapy], 53422 [therapeutic activities], 37090 [unattended electrical stimulation], and 82384 [vasopneumatic device]    BUNNY GONSALVES, PT  9/16/2024

## 2024-09-23 ENCOUNTER — CLINICAL SUPPORT (OUTPATIENT)
Dept: REHABILITATION | Facility: HOSPITAL | Age: 69
End: 2024-09-23
Payer: MEDICARE

## 2024-09-23 DIAGNOSIS — M25.561 CHRONIC PAIN OF RIGHT KNEE: Primary | ICD-10-CM

## 2024-09-23 DIAGNOSIS — G89.29 CHRONIC PAIN OF RIGHT KNEE: Primary | ICD-10-CM

## 2024-09-23 DIAGNOSIS — M25.552 LEFT HIP PAIN: ICD-10-CM

## 2024-09-23 PROCEDURE — 97530 THERAPEUTIC ACTIVITIES: CPT

## 2024-09-23 PROCEDURE — 97110 THERAPEUTIC EXERCISES: CPT

## 2024-09-23 NOTE — PROGRESS NOTES
[facility]   Physical Therapy Treatment Note     Name: Kash Dumont  Clinic Number: [id number]    Therapy Diagnosis:   Encounter Diagnoses   Name Primary?    Chronic pain of right knee Yes    Left hip pain        Physician: Ollie Napoles DO    Visit Date: 9/23/2024    Physician Orders: PT Eval and Treat   Medical Diagnosis from Referral: Right Knee and Left Hip Pain   Evaluation Date: 8/26/2024  Authorization Period Expiration: [employer] Man[employer]   Plan of Care Expiration: 10/25/2024   Visit # / Visits authorized: 4/ [employer]    FOTO: 29/100    PTA Visit #: 0/5    Time In: 8:27 am   Time Out: 9:17 am   Total Billable Time: 40 minutes    Precautions: Standard  Functional Level at time of Evaluation: Right Knee and Left Hip pain limits all of her mobility; She has difficulty with ADLs and activities due to pain.    Subjective     Pt reports: Her pain in the hip and knee are pretty bad today.   She was compliant with home exercise program.    Pain: 7/10 Left Hip and 9/10 Right Knee  Location: right knee and Left Hip     Objective       Kash received therapeutic exercises to develop strength, endurance, ROM, and flexibility for 16 minutes including:    NuStep x 5 Min  (Stationary Bike on 9/23)  SB 4 x 15 sec   Hamstring Stretch on Stairs 4 x 15 sec   Knee Flexion Stretch on Stairs      Supine :   Quad Sets 2 x 10   Heel Slides x 10   Short Arc Quads x 10    Straight Leg Raises x 10   Hip Abduction x 10   Bridging x 10      Sitting :  Marching 2 x 10   LAQs 2 x 10   Hamstring Curls 2 x 10  with green theraband (added 9/16)  Hip Abd/Add 2 x 10  with green theraband (added 9/16)  Ankle Pumps             Kash participated in neuromuscular re-education activities to improve: Balance, Coordination, Kinesthetic, Sense, and Proprioception for 0 minutes. The following activities were included:    Single Leg Stance - Firm   Single Leg Stance - Foam     Kash participated in dynamic  functional therapeutic activities to improve functional performance for 24  minutes, including:    Standing :  Standing Marches x 10   Squat to chair/Shallow standing knee bends 2 x 5   Hip Abduction x 10  Hip Extension x 10 (added 9/16)  Heel Raises 2 x 10  (added 9/16)  Forward Step Ups 4 inch x 10 bilateral  (added 9/16)       Moist Heat to Left Hip and Right Knee at end of session x 10 minutes.       Home Exercises Provided and Patient Education Provided     Education provided: Therapist initiated patient's Home Exercise Program today. Patient was given a written copy of the Home Exercise Program with pictures and written instructions for each exercise.  Instructed patient on proper form for all exercises and had patient return demonstration.       Written Home Exercises Provided: yes.  Exercises were reviewed and Kash was able to demonstrate them prior to the end of the session.  Kash demonstrated fair  understanding of the education provided.     See EMR under Patient Instructions for exercises provided 9/9/2024.    Assessment     Patient reports her Left hip pain has been bad since last Tuesday. Her self reported pain today was 7/10 in the Left Hip and 9/10 in the Right Knee. Therapist adjusted her exercises today and held the resistance bands due to pain level. Therapist is working on trying to increase her Lower Extremity strength but her main limiting factor is pain and deconditioning. We will attempt to increase her slowly as able.               PMH at time of Evaluation :   Kash is a 69 y.o. female referred to outpatient Physical Therapy with a medical diagnosis of Right Knee and Left Hip Pain. Kash reports: she has had Right Knee pain for about a year. She was seen by Dr Whiteside on 5/15/2024. Knee was swollen at that time. MD tried to remove fluid but could not. Patient opted for steroid injection in the Right Knee. This has been approximately 3 months ago and now the steroid has worn off. Right knee  is very painful especially with weight bearing. Patient has been trying to offload weight from the Right leg on to the Left and this has now caused Left Hip pain and dysfunction. Patient saw Dr Trevon Napoles this month. Dr Napoles ordered Outpatient Physical Therapy and patient is here today for the Evaluation.      Patient presents with decreased Range of Motion in Right Knee and Left Hip. She has reduced strength in both of these as well. X Rays show degenerative changes in the right knee and the left hip with knee worse than hip. Patient has difficulty with sit to stand secondary to pain in bilateral legs. During ambulation, patient noted to have significant genu valgus of the Right Knee. She has decreased stance time and antalgic gait on the Right. However, Left Hip is also painful with weight bearing and so stance time is limited on the left as well.  Patient has significantly decreased step/stride length resulting in slow caron.   Patient will require Physical Therapy Intervention to address all deficits and work toward completion of all goals set. Therapist will refer patient back to MD upon completion of Therapy for further assessment and possible MRI if pain and dysfunction persist. Feel she will likely require Right TKA in the future due to the structural changes and genu valgus that was noted today upon the Evaluation.       Kash Is progressing well towards her goals.   Pt prognosis is Good.     Pt will continue to benefit from skilled outpatient physical therapy to address the deficits listed in the problem list box on initial evaluation, provide pt/family education and to maximize pt's level of independence in the home and community environment.     Pt's spiritual, cultural and educational needs considered and pt agreeable to plan of care and goals.     Anticipated Barriers for therapy: Patient will likely need TKA in the future    Goals:  Short Term Goals: 4 weeks   1. Independent with Home Exercise  Program   2. Increase Right Knee Range of Motion to 0 Degrees to 120 Degrees  3. Increase Right Knee Strength to grossly 4/5  4. Increase Left Hip Range of Motion to Within Functional Limits   5. Increase Left Hip Strength to grossly 4/5  6. Patient will ambulate 500 feet with stance time approximately equal from Right to Left and with complaints of pain Less than or Equal to 4/10.             Long Term Goals: 8 weeks   1. Patient will increase Right Knee and Left Hip Strength to grossly 4+/5  2. Patient will ambulate 1000+ feet with stance time equal from Right to Left and with complaints of pain Less than or Equal to 3/10.     Plan     Plan of care Certification: 8/26/2024 to 10/25/2024.     Outpatient Physical Therapy 2 times weekly for 8 weeks to include the following interventions: 84671 [therapeutic exercise], 03828 [neuromuscular re-education], 80663 [gait training], 94079 [manual therapy], 99653 [therapeutic activities], 19119 [unattended electrical stimulation], and 84693 [vasopneumatic device]    BUNNY GONSALVES, PT, DPT   9/23/2024

## 2024-09-30 ENCOUNTER — CLINICAL SUPPORT (OUTPATIENT)
Dept: REHABILITATION | Facility: HOSPITAL | Age: 69
End: 2024-09-30
Payer: MEDICARE

## 2024-09-30 DIAGNOSIS — M25.561 CHRONIC PAIN OF RIGHT KNEE: Primary | ICD-10-CM

## 2024-09-30 DIAGNOSIS — M25.552 LEFT HIP PAIN: ICD-10-CM

## 2024-09-30 DIAGNOSIS — G89.29 CHRONIC PAIN OF RIGHT KNEE: Primary | ICD-10-CM

## 2024-09-30 PROCEDURE — 97530 THERAPEUTIC ACTIVITIES: CPT

## 2024-09-30 PROCEDURE — 97110 THERAPEUTIC EXERCISES: CPT

## 2024-09-30 NOTE — PROGRESS NOTES
[facility]   Physical Therapy Treatment Note     Name: Kash Dumont  Clinic Number: [id number]    Therapy Diagnosis:   Encounter Diagnoses   Name Primary?    Chronic pain of right knee Yes    Left hip pain        Physician: Ollie Napoles DO    Visit Date: 9/30/2024    Physician Orders: PT Eval and Treat   Medical Diagnosis from Referral: Right Knee and Left Hip Pain   Evaluation Date: 8/26/2024  Authorization Period Expiration: [employer] Man[employer]   Plan of Care Expiration: 10/25/2024   Visit # / Visits authorized: 5/ [employer]    FOTO: 29/100    PTA Visit #: 0/5    Time In: 8:25 am   Time Out: 9:06 am   Total Billable Time: 41 minutes    Precautions: Standard  Functional Level at time of Evaluation: Right Knee and Left Hip pain limits all of her mobility; She has difficulty with ADLs and activities due to pain.    Subjective     Pt reports: Her pain in the hip and knee are better today.   She was compliant with home exercise program.    Pain: 3/10 Left Hip and 4/10 Right Knee  Location: right knee and Left Hip     Objective       Kash received therapeutic exercises to develop strength, endurance, ROM, and flexibility for 16 minutes including:    NuStep x 5 Min   SB 4 x 15 sec   Hamstring Stretch on Stairs 5 x 15 sec   Knee Flexion Stretch on Stairs      Supine :   Quad Sets 2 x 10   Heel Slides x 10   Short Arc Quads x 10    Straight Leg Raises x 10   Hip Abduction x 10   Bridging x 10      Sitting :  Marching 2 x 10   LAQs 2 x 10   Hamstring Curls 2 x 10  with green theraband (added 9/16)  Hip Abd/Add 2 x 10  with green theraband and soccer ball (added 9/16)  Ankle Pumps             Kash participated in neuromuscular re-education activities to improve: Balance, Coordination, Kinesthetic, Sense, and Proprioception for 0 minutes. The following activities were included:    Single Leg Stance - Firm   Single Leg Stance - Foam     Kash participated in dynamic functional  therapeutic activities to improve functional performance for 25  minutes, including:    Standing :  Standing Marches x 10   Squat to chair 2 x 10 (increased # reps on 9/30)   Hip Abduction 2 x 10 (increased # reps on 9/30)  Hip Extension 2 x 10 (increased # reps on 9/30)  Heel Raises 2 x 10  (added 9/16)  Forward Step Ups 4 inch 4 x 10 bilateral  (added 9/16)       Moist Heat to Left Hip and Right Knee at end of session x 10 minutes.       Home Exercises Provided and Patient Education Provided     Education provided: Therapist initiated patient's Home Exercise Program today. Patient was given a written copy of the Home Exercise Program with pictures and written instructions for each exercise.  Instructed patient on proper form for all exercises and had patient return demonstration.       Written Home Exercises Provided: yes.  Exercises were reviewed and Kash was able to demonstrate them prior to the end of the session.  Kash demonstrated fair  understanding of the education provided.     See EMR under Patient Instructions for exercises provided 9/9/2024.    Assessment     Patient reports her Left hip pain has been a lot better the last few days. The right knee, however, remains painful. She reports the Right knee is a 4/10 today. Last week it was a 9/10 and she thinks some of that may have been due to the weather causing her OA pain to worsen. Today she states she thinks she can do a little more. Therapist increased her reps on exercises as listed above. She was able to increase her sit to stand today from 2 x 5 to 4 x 10 and the step ups from 10 each to 20 each. She wanted to try to do more today and was actually able to tolerated this very well. Therapist attempted to use resistance band during standing hip exercises but she was unable to tolerate the resistance. She was, however, able to increase the number of reps on these exercises. We plan to continue to progress her as tolerated. Sup Visit performed today  with [facility] Fofana, [facility] Trivedi, and [facility] Hall.  All goals and treatment plan reviewed. Will work toward completion of all goals set.                      PMH at time of Evaluation :   Kash is a 69 y.o. female referred to outpatient Physical Therapy with a medical diagnosis of Right Knee and Left Hip Pain. Kash reports: she has had Right Knee pain for about a year. She was seen by Dr Whiteside on 5/15/2024. Knee was swollen at that time. MD tried to remove fluid but could not. Patient opted for steroid injection in the Right Knee. This has been approximately 3 months ago and now the steroid has worn off. Right knee is very painful especially with weight bearing. Patient has been trying to offload weight from the Right leg on to the Left and this has now caused Left Hip pain and dysfunction. Patient saw Dr Trevon Napoles this month. Dr Napoles ordered Outpatient Physical Therapy and patient is here today for the Evaluation.      Patient presents with decreased Range of Motion in Right Knee and Left Hip. She has reduced strength in both of these as well. X Rays show degenerative changes in the right knee and the left hip with knee worse than hip. Patient has difficulty with sit to stand secondary to pain in bilateral legs. During ambulation, patient noted to have significant genu valgus of the Right Knee. She has decreased stance time and antalgic gait on the Right. However, Left Hip is also painful with weight bearing and so stance time is limited on the left as well.  Patient has significantly decreased step/stride length resulting in slow caron.   Patient will require Physical Therapy Intervention to address all deficits and work toward completion of all goals set. Therapist will refer patient back to MD upon completion of Therapy for further assessment and possible MRI if pain and dysfunction persist. Feel she will likely require Right TKA in the future due to the structural changes and genu  valgus that was noted today upon the Evaluation.       Kash Is progressing well towards her goals.   Pt prognosis is Good.     Pt will continue to benefit from skilled outpatient physical therapy to address the deficits listed in the problem list box on initial evaluation, provide pt/family education and to maximize pt's level of independence in the home and community environment.     Pt's spiritual, cultural and educational needs considered and pt agreeable to plan of care and goals.     Anticipated Barriers for therapy: Patient will likely need TKA in the future    Goals:  Short Term Goals: 4 weeks   1. Independent with Home Exercise Program   2. Increase Right Knee Range of Motion to 0 Degrees to 120 Degrees  3. Increase Right Knee Strength to grossly 4/5  4. Increase Left Hip Range of Motion to Within Functional Limits   5. Increase Left Hip Strength to grossly 4/5  6. Patient will ambulate 500 feet with stance time approximately equal from Right to Left and with complaints of pain Less than or Equal to 4/10.             Long Term Goals: 8 weeks   1. Patient will increase Right Knee and Left Hip Strength to grossly 4+/5  2. Patient will ambulate 1000+ feet with stance time equal from Right to Left and with complaints of pain Less than or Equal to 3/10.     Plan     Plan of care Certification: 8/26/2024 to 10/25/2024.     Outpatient Physical Therapy 2 times weekly for 8 weeks to include the following interventions: 38615 [therapeutic exercise], 59220 [neuromuscular re-education], 00804 [gait training], 83020 [manual therapy], 41941 [therapeutic activities], 99664 [unattended electrical stimulation], and 31833 [vasopneumatic device]    BUNNY GONSALVES, PT, DPT   9/30/2024

## 2024-10-02 RX ORDER — DICLOFENAC SODIUM 10 MG/G
GEL TOPICAL 3 TIMES DAILY
Qty: 100 G | Refills: 1 | Status: SHIPPED | OUTPATIENT
Start: 2024-10-02

## 2024-10-10 ENCOUNTER — CLINICAL SUPPORT (OUTPATIENT)
Dept: REHABILITATION | Facility: HOSPITAL | Age: 69
End: 2024-10-10
Payer: MEDICARE

## 2024-10-10 DIAGNOSIS — M25.561 CHRONIC PAIN OF RIGHT KNEE: Primary | ICD-10-CM

## 2024-10-10 DIAGNOSIS — M25.552 LEFT HIP PAIN: ICD-10-CM

## 2024-10-10 DIAGNOSIS — G89.29 CHRONIC PAIN OF RIGHT KNEE: Primary | ICD-10-CM

## 2024-10-10 PROCEDURE — 97530 THERAPEUTIC ACTIVITIES: CPT

## 2024-10-10 PROCEDURE — 97110 THERAPEUTIC EXERCISES: CPT

## 2024-10-10 NOTE — PROGRESS NOTES
[facility]   Physical Therapy Treatment Note     Name: Kash Dumont  Clinic Number: [id number]    Therapy Diagnosis:   Encounter Diagnoses   Name Primary?    Chronic pain of right knee Yes    Left hip pain        Physician: Ollie Napoles DO    Visit Date: 10/10/2024    Physician Orders: PT Eval and Treat   Medical Diagnosis from Referral: Right Knee and Left Hip Pain   Evaluation Date: 8/26/2024  Authorization Period Expiration: [employer] Man[employer]   Plan of Care Expiration: 10/25/2024   Visit # / Visits authorized: 6/ [employer]    FOTO: 29/100    PTA Visit #: 0/5    Time In: 10:05 am   Time Out: 10:45 am   Total Billable Time: 40 minutes    Precautions: Standard  Functional Level at time of Evaluation: Right Knee and Left Hip pain limits all of her mobility; She has difficulty with ADLs and activities due to pain.    Subjective     Pt reports: Her pain in the Right Knee is really bad today.  She was compliant with home exercise program.    Pain: 3/10 Left Hip and 7/10 Right Knee  Location: right knee and Left Hip     Objective       Kash received therapeutic exercises to develop strength, endurance, ROM, and flexibility for 15 minutes including:    NuStep x 5 Min   SB 4 x 15 sec   Hamstring Stretch on Stairs 5 x 15 sec   Knee Flexion Stretch on Stairs      Supine :   Quad Sets 2 x 10   Heel Slides x 10   Short Arc Quads x 10    Straight Leg Raises x 10   Hip Abduction x 10   Bridging x 10      Sitting :  Marching 2 x 10   LAQs 2 x 10   Hamstring Curls 2 x 10  with green theraband (added 9/16)  Hip Abd/Add 2 x 10  with green theraband and soccer ball (added 9/16)  Ankle Pumps             Kash participated in neuromuscular re-education activities to improve: Balance, Coordination, Kinesthetic, Sense, and Proprioception for 0 minutes. The following activities were included:    Single Leg Stance - Firm   Single Leg Stance - Foam     Kash participated in dynamic functional  therapeutic activities to improve functional performance for 25  minutes, including:    Standing :  Standing Marches x 10   Squat to chair 2 x 10 (increased # reps on 9/30)   Hip Abduction 2 x 10 (increased # reps on 9/30)  Hip Extension 2 x 10 (increased # reps on 9/30)  Heel Raises 2 x 10  (added 9/16)  Forward Step Ups 4 inch 4 x 10 bilateral  (added 9/16)       Moist Heat to Left Hip and Right Knee at end of session x 0 minutes.       Home Exercises Provided and Patient Education Provided     Education provided: Therapist initiated patient's Home Exercise Program today. Patient was given a written copy of the Home Exercise Program with pictures and written instructions for each exercise.  Instructed patient on proper form for all exercises and had patient return demonstration.       Written Home Exercises Provided: yes.  Exercises were reviewed and Kash was able to demonstrate them prior to the end of the session.  Kash demonstrated fair  understanding of the education provided.     See EMR under Patient Instructions for exercises provided 9/9/2024.    Assessment     Patient reports her Right Knee is really hurting today. Pain reported Knee pain at a 7/10. Right Knee remains unstable with ambulation due to worsening OA. She works hard in therapy and strength is improving, but pain remains consistent. Feel like she will likely require a TKA. Therapist plans to see patient next week and Discharge her from Therapy. Therapist will refer her back to MD for further assessment.                     PMH at time of Evaluation :   Kash is a 69 y.o. female referred to outpatient Physical Therapy with a medical diagnosis of Right Knee and Left Hip Pain. Kash reports: she has had Right Knee pain for about a year. She was seen by Dr Whiteside on 5/15/2024. Knee was swollen at that time. MD tried to remove fluid but could not. Patient opted for steroid injection in the Right Knee. This has been approximately 3 months ago and  now the steroid has worn off. Right knee is very painful especially with weight bearing. Patient has been trying to offload weight from the Right leg on to the Left and this has now caused Left Hip pain and dysfunction. Patient saw Dr Trevon Napoles this month. Dr Napoles ordered Outpatient Physical Therapy and patient is here today for the Evaluation.      Patient presents with decreased Range of Motion in Right Knee and Left Hip. She has reduced strength in both of these as well. X Rays show degenerative changes in the right knee and the left hip with knee worse than hip. Patient has difficulty with sit to stand secondary to pain in bilateral legs. During ambulation, patient noted to have significant genu valgus of the Right Knee. She has decreased stance time and antalgic gait on the Right. However, Left Hip is also painful with weight bearing and so stance time is limited on the left as well.  Patient has significantly decreased step/stride length resulting in slow caron.   Patient will require Physical Therapy Intervention to address all deficits and work toward completion of all goals set. Therapist will refer patient back to MD upon completion of Therapy for further assessment and possible MRI if pain and dysfunction persist. Feel she will likely require Right TKA in the future due to the structural changes and genu valgus that was noted today upon the Evaluation.       Kash Is progressing well towards her goals.   Pt prognosis is Good.     Pt will continue to benefit from skilled outpatient physical therapy to address the deficits listed in the problem list box on initial evaluation, provide pt/family education and to maximize pt's level of independence in the home and community environment.     Pt's spiritual, cultural and educational needs considered and pt agreeable to plan of care and goals.     Anticipated Barriers for therapy: Patient will likely need TKA in the future    Goals:  Short Term Goals: 4  weeks   1. Independent with Home Exercise Program   2. Increase Right Knee Range of Motion to 0 Degrees to 120 Degrees  3. Increase Right Knee Strength to grossly 4/5  4. Increase Left Hip Range of Motion to Within Functional Limits   5. Increase Left Hip Strength to grossly 4/5  6. Patient will ambulate 500 feet with stance time approximately equal from Right to Left and with complaints of pain Less than or Equal to 4/10.             Long Term Goals: 8 weeks   1. Patient will increase Right Knee and Left Hip Strength to grossly 4+/5  2. Patient will ambulate 1000+ feet with stance time equal from Right to Left and with complaints of pain Less than or Equal to 3/10.     Plan     Plan of care Certification: 8/26/2024 to 10/25/2024.     Outpatient Physical Therapy 2 times weekly for 8 weeks to include the following interventions: 14053 [therapeutic exercise], 78546 [neuromuscular re-education], 07560 [gait training], 67438 [manual therapy], 17314 [therapeutic activities], 68217 [unattended electrical stimulation], and 68670 [vasopneumatic device]    BUNNY GONSALVES, PT, DPT   10/10/2024

## 2024-10-14 ENCOUNTER — CLINICAL SUPPORT (OUTPATIENT)
Dept: REHABILITATION | Facility: HOSPITAL | Age: 69
End: 2024-10-14
Payer: MEDICARE

## 2024-10-14 DIAGNOSIS — G89.29 CHRONIC PAIN OF RIGHT KNEE: Primary | ICD-10-CM

## 2024-10-14 DIAGNOSIS — M25.552 LEFT HIP PAIN: ICD-10-CM

## 2024-10-14 DIAGNOSIS — M25.561 CHRONIC PAIN OF RIGHT KNEE: Primary | ICD-10-CM

## 2024-10-14 PROCEDURE — 97530 THERAPEUTIC ACTIVITIES: CPT

## 2024-10-14 PROCEDURE — 97110 THERAPEUTIC EXERCISES: CPT

## 2024-10-14 NOTE — PLAN OF CARE
[facility]   Physical Therapy Discharge Summary      Name: Kash Dumont  Clinic Number: [id number]    Therapy Diagnosis:   Encounter Diagnoses   Name Primary?    Chronic pain of right knee Yes    Left hip pain        Physician: Ollie Napoles DO    Visit Date: 10/14/2024    Physician Orders: PT Eval and Treat   Medical Diagnosis from Referral: Right Knee and Left Hip Pain   Evaluation Date: 8/26/2024  Authorization Period Expiration: [employer] Man[employer]   Plan of Care Expiration: 10/25/2024   Visit # / Visits authorized: 7/ [employer]    FOTO: 29/100    PTA Visit #: 0/5    Time In: 10:01 am   Time Out: 10:44 am   Total Billable Time: 40 minutes    Precautions: Standard  Functional Level at time of Evaluation: Right Knee and Left Hip pain limits all of her mobility; She has difficulty with ADLs and activities due to pain.    Subjective     Pt reports: Right Knee pain remains significant at 7/10. Left Hip pain is very minimal today at 2/10.  She was compliant with home exercise program.    Pain: 2/10 Left Hip and 7/10 Right Knee  Location: right knee and Left Hip     Objective     Reviewed the Home Exercise Program and performed the following :     Kash received therapeutic exercises to develop strength, endurance, ROM, and flexibility for 15 minutes including:    NuStep x 5 Min   SB 4 x 15 sec   Hamstring Stretch on Stairs 5 x 15 sec   Knee Flexion Stretch on Stairs      Supine :   Quad Sets 2 x 10   Heel Slides x 10   Short Arc Quads x 10    Straight Leg Raises x 10   Hip Abduction x 10   Bridging x 10      Sitting :  Marching 2 x 10   LAQs 2 x 10   Hamstring Curls 2 x 10  with green theraband (added 9/16)  Hip Abd/Add 2 x 10  with green theraband and soccer ball (added 9/16)  Ankle Pumps             Kash participated in neuromuscular re-education activities to improve: Balance, Coordination, Kinesthetic, Sense, and Proprioception for 0 minutes. The following activities  were included:    Single Leg Stance - Firm   Single Leg Stance - Foam     Kash participated in dynamic functional therapeutic activities to improve functional performance for 25  minutes, including:    Standing :  Standing Marches x 10   Squat to chair 2 x 10 (increased # reps on 9/30)   Hip Abduction 2 x 10 (increased # reps on 9/30)  Hip Extension 2 x 10 (increased # reps on 9/30)  Heel Raises 2 x 10  (added 9/16)  Forward Step Ups 4 inch 4 x 10 bilateral  (added 9/16)       Moist Heat to Left Hip and Right Knee at end of session x 0 minutes.       Home Exercises Provided and Patient Education Provided     Education provided: Therapist upgraded patient's Home Exercise Program today in preparation for Discharge from Therapy. Patient was given a red theraband and a written copy of the Home Exercise Program with pictures and written instructions for each exercise.  Instructed patient on proper form for all exercises and had patient return demonstration.         Written Home Exercises Provided: yes.  Exercises were reviewed and Kash was able to demonstrate them prior to the end of the session.  Kash demonstrated fair  understanding of the education provided.     See EMR under Patient Instructions for exercises provided 9/9/2024.    Assessment     Patient received Physical Therapy for 7 weeks and 7 visits. Right Knee pain remains significant at 7/10. Left Hip pain is very minimal today at 2/10.   Right Knee remains unstable with ambulation due to worsening OA. She worked hard in therapy and strength did improve, but pain remains consistent. Feel like she will likely require a TKA. Therapist upgraded patient's Home Exercise Program today in preparation for Discharge from Therapy. Patient was given a red theraband and a written copy of the Home Exercise Program with pictures and written instructions for each exercise.  Instructed patient on proper form for all exercises and had patient return demonstration.   Patient  is being discharged from therapy today with failed conservative measures for alleviating the Right Knee Pain. Therapist is referring her back to MD for further assessment. Feel she will benefit from Orthopedic consult for possible TKA.                   PMH at time of Evaluation :   Kash is a 69 y.o. female referred to outpatient Physical Therapy with a medical diagnosis of Right Knee and Left Hip Pain. Kash reports: she has had Right Knee pain for about a year. She was seen by Dr Whiteside on 5/15/2024. Knee was swollen at that time. MD tried to remove fluid but could not. Patient opted for steroid injection in the Right Knee. This has been approximately 3 months ago and now the steroid has worn off. Right knee is very painful especially with weight bearing. Patient has been trying to offload weight from the Right leg on to the Left and this has now caused Left Hip pain and dysfunction. Patient saw Dr Trevon Napoles this month. Dr Napoles ordered Outpatient Physical Therapy and patient is here today for the Evaluation.      Patient presents with decreased Range of Motion in Right Knee and Left Hip. She has reduced strength in both of these as well. X Rays show degenerative changes in the right knee and the left hip with knee worse than hip. Patient has difficulty with sit to stand secondary to pain in bilateral legs. During ambulation, patient noted to have significant genu valgus of the Right Knee. She has decreased stance time and antalgic gait on the Right. However, Left Hip is also painful with weight bearing and so stance time is limited on the left as well.  Patient has significantly decreased step/stride length resulting in slow caron.   Patient will require Physical Therapy Intervention to address all deficits and work toward completion of all goals set. Therapist will refer patient back to MD upon completion of Therapy for further assessment and possible MRI if pain and dysfunction persist. Feel she will  likely require Right TKA in the future due to the structural changes and genu valgus that was noted today upon the Evaluation.          Anticipated Barriers for therapy: Patient will likely need TKA in the future    Goals:  Short Term Goals: 4 weeks   1. Independent with Home Exercise Program - Goal Met   2. Increase Right Knee Range of Motion to 0 Degrees to 120 Degrees - Goal Not Met (Range of Motion is -5 to 100 degrees)  3. Increase Right Knee Strength to grossly 4/5- Goal Not Met (Strength is grossly 4-/5 due to pain)  4. Increase Left Hip Range of Motion to Within Functional Limits - Goal Met   5. Increase Left Hip Strength to grossly 4/5- Goal Met   6. Patient will ambulate 500 feet with stance time approximately equal from Right to Left and with complaints of pain Less than or Equal to 4/10.  - Goal Not Met            Long Term Goals: 8 weeks   1. Patient will increase Right Knee and Left Hip Strength to grossly 4+/5 - Goal Not Met   2. Patient will ambulate 1000+ feet with stance time equal from Right to Left and with complaints of pain Less than or Equal to 3/10. - Goal Not Met     Discharge reason: Patient has reached the maximum rehab potential for the present time    Plan   This patient is discharged from Physical Therapy.    Date of Last visit: 10/14/2024  Total Visits Received: 7  Cancelled Visits: 0  No Show Visits: 0    BUNNY GONSALVES PT, DPT

## 2024-10-14 NOTE — PROGRESS NOTES
[facility]   Physical Therapy Treatment Note     Name: Kash Dumont  Clinic Number: [id number]    Therapy Diagnosis:   Encounter Diagnoses   Name Primary?    Chronic pain of right knee Yes    Left hip pain        Physician: Ollie Napoles DO    Visit Date: 10/14/2024    Physician Orders: PT Eval and Treat   Medical Diagnosis from Referral: Right Knee and Left Hip Pain   Evaluation Date: 8/26/2024  Authorization Period Expiration: [employer]   Plan of Care Expiration: 10/25/2024   Visit # / Visits authorized: 6/ [employer]    FOTO: 29/100    PTA Visit #: 0/5    Time In: 10:01 am   Time Out: 10:44 am   Total Billable Time: 40 minutes    Precautions: Standard  Functional Level at time of Evaluation: Right Knee and Left Hip pain limits all of her mobility; She has difficulty with ADLs and activities due to pain.    Subjective     Pt reports: Right Knee pain remains significant at 7/10. Left Hip pain is very minimal today at 2/10.  She was compliant with home exercise program.    Pain: 2/10 Left Hip and 7/10 Right Knee  Location: right knee and Left Hip     Objective       Kash received therapeutic exercises to develop strength, endurance, ROM, and flexibility for 15 minutes including:    NuStep x 5 Min   SB 4 x 15 sec   Hamstring Stretch on Stairs 5 x 15 sec   Knee Flexion Stretch on Stairs      Supine :   Quad Sets 2 x 10   Heel Slides x 10   Short Arc Quads x 10    Straight Leg Raises x 10   Hip Abduction x 10   Bridging x 10      Sitting :  Marching 2 x 10   LAQs 2 x 10   Hamstring Curls 2 x 10  with green theraband (added 9/16)  Hip Abd/Add 2 x 10  with green theraband and soccer ball (added 9/16)  Ankle Pumps             Kash participated in neuromuscular re-education activities to improve: Balance, Coordination, Kinesthetic, Sense, and Proprioception for 0 minutes. The following activities were included:    Single Leg Stance - Firm   Single Leg Stance - Foam      Kash participated in dynamic functional therapeutic activities to improve functional performance for 25  minutes, including:    Standing :  Standing Marches x 10   Squat to chair 2 x 10 (increased # reps on 9/30)   Hip Abduction 2 x 10 (increased # reps on 9/30)  Hip Extension 2 x 10 (increased # reps on 9/30)  Heel Raises 2 x 10  (added 9/16)  Forward Step Ups 4 inch 4 x 10 bilateral  (added 9/16)       Moist Heat to Left Hip and Right Knee at end of session x 0 minutes.       Home Exercises Provided and Patient Education Provided     Education provided: Therapist upgraded patient's Home Exercise Program today in preparation for Discharge from Therapy. Patient was given a red theraband and a written copy of the Home Exercise Program with pictures and written instructions for each exercise.  Instructed patient on proper form for all exercises and had patient return demonstration.         Written Home Exercises Provided: yes.  Exercises were reviewed and Kash was able to demonstrate them prior to the end of the session.  Kash demonstrated fair  understanding of the education provided.     See EMR under Patient Instructions for exercises provided 9/9/2024.    Assessment     Patient received Physical Therapy for 7 weeks and 7 visits. Right Knee pain remains significant at 7/10. Left Hip pain is very minimal today at 2/10.   Right Knee remains unstable with ambulation due to worsening OA. She worked hard in therapy and strength did improve, but pain remains consistent. Feel like she will likely require a TKA. Therapist upgraded patient's Home Exercise Program today in preparation for Discharge from Therapy. Patient was given a red theraband and a written copy of the Home Exercise Program with pictures and written instructions for each exercise.  Instructed patient on proper form for all exercises and had patient return demonstration.   Patient is being discharged from therapy today with failed conservative  measures for alleviating the Right Knee Pain. Therapist is referring her back to MD for further assessment. Feel she will benefit from Orthopedic consult for possible TKA.                   PMH at time of Evaluation :   Kash is a 69 y.o. female referred to outpatient Physical Therapy with a medical diagnosis of Right Knee and Left Hip Pain. Kash reports: she has had Right Knee pain for about a year. She was seen by Dr Whiteside on 5/15/2024. Knee was swollen at that time. MD tried to remove fluid but could not. Patient opted for steroid injection in the Right Knee. This has been approximately 3 months ago and now the steroid has worn off. Right knee is very painful especially with weight bearing. Patient has been trying to offload weight from the Right leg on to the Left and this has now caused Left Hip pain and dysfunction. Patient saw Dr Trevon Napoles this month. Dr Napoles ordered Outpatient Physical Therapy and patient is here today for the Evaluation.      Patient presents with decreased Range of Motion in Right Knee and Left Hip. She has reduced strength in both of these as well. X Rays show degenerative changes in the right knee and the left hip with knee worse than hip. Patient has difficulty with sit to stand secondary to pain in bilateral legs. During ambulation, patient noted to have significant genu valgus of the Right Knee. She has decreased stance time and antalgic gait on the Right. However, Left Hip is also painful with weight bearing and so stance time is limited on the left as well.  Patient has significantly decreased step/stride length resulting in slow caron.   Patient will require Physical Therapy Intervention to address all deficits and work toward completion of all goals set. Therapist will refer patient back to MD upon completion of Therapy for further assessment and possible MRI if pain and dysfunction persist. Feel she will likely require Right TKA in the future due to the structural changes  and genu valgus that was noted today upon the Evaluation.          Anticipated Barriers for therapy: Patient will likely need TKA in the future    Goals:  Short Term Goals: 4 weeks   1. Independent with Home Exercise Program - Goal Met   2. Increase Right Knee Range of Motion to 0 Degrees to 120 Degrees - Goal Not Met (Range of Motion is -5 to 100 degrees)  3. Increase Right Knee Strength to grossly 4/5- Goal Not Met (Strength is grossly 4-/5 due to pain)  4. Increase Left Hip Range of Motion to Within Functional Limits - Goal Met   5. Increase Left Hip Strength to grossly 4/5- Goal Met   6. Patient will ambulate 500 feet with stance time approximately equal from Right to Left and with complaints of pain Less than or Equal to 4/10.  - Goal Not Met            Long Term Goals: 8 weeks   1. Patient will increase Right Knee and Left Hip Strength to grossly 4+/5 - Goal Not Met   2. Patient will ambulate 1000+ feet with stance time equal from Right to Left and with complaints of pain Less than or Equal to 3/10. - Goal Not Met     Plan     Patient to be Discharged from Physical Therapy services following today's treatment. See Discharge Summary if needed.       BUNNY GONSALVES, PT, DPT   10/14/2024

## 2024-10-21 RX ORDER — METHOCARBAMOL 750 MG/1
750 TABLET, FILM COATED ORAL 4 TIMES DAILY PRN
Qty: 40 TABLET | Refills: 1 | Status: SHIPPED | OUTPATIENT
Start: 2024-10-21

## 2024-10-29 ENCOUNTER — HOSPITAL ENCOUNTER (OUTPATIENT)
Dept: RADIOLOGY | Facility: HOSPITAL | Age: 69
Discharge: HOME | End: 2024-10-29
Attending: RADIOLOGY
Payer: MEDICARE

## 2024-10-29 DIAGNOSIS — Z12.31 VISIT FOR SCREENING MAMMOGRAM: ICD-10-CM

## 2024-10-29 PROCEDURE — 77063 BREAST TOMOSYNTHESIS BI: CPT | Mod: 26,52,, | Performed by: RADIOLOGY

## 2024-10-29 PROCEDURE — 77063 BREAST TOMOSYNTHESIS BI: CPT | Mod: TC,52

## 2024-10-29 PROCEDURE — 77067 SCR MAMMO BI INCL CAD: CPT | Mod: 26,52,, | Performed by: RADIOLOGY

## 2024-11-14 ENCOUNTER — TELEPHONE (OUTPATIENT)
Dept: FAMILY MEDICINE | Facility: CLINIC | Age: 69
End: 2024-11-14
Payer: MEDICARE

## 2024-11-14 NOTE — TELEPHONE ENCOUNTER
----- Message from Lina sent at 11/14/2024 10:29 AM CST -----  Regarding: APPT PT CALL BACK  Who Called: Kash Dumont    Caller is requesting a sooner appointment. Caller declined first available appointment listed below. Caller will not accept being placed on the waitlist and is requesting a message be sent to doctor.    When is the first available appointment? NO AVAILABLE       - PT MISSED APPT TODAY AND NEED TO RESCHEDULE THE 5 MONTH F/.U APPT       Preferred Method of Contact: Phone Call  Patient's Preferred Phone Number on File: [phone]

## 2024-11-18 RX ORDER — HYDRALAZINE HYDROCHLORIDE 25 MG/1
TABLET, FILM COATED ORAL
Qty: 270 TABLET | Refills: 3 | Status: SHIPPED | OUTPATIENT
Start: 2024-11-18

## 2024-12-03 DIAGNOSIS — Z86.0100 HX OF COLONIC POLYPS: Primary | ICD-10-CM

## 2024-12-04 ENCOUNTER — OFFICE VISIT (OUTPATIENT)
Dept: FAMILY MEDICINE | Facility: CLINIC | Age: 69
End: 2024-12-04
Payer: MEDICARE

## 2024-12-04 VITALS
TEMPERATURE: 98 F | DIASTOLIC BLOOD PRESSURE: 86 MMHG | HEIGHT: 65 IN | OXYGEN SATURATION: 98 % | SYSTOLIC BLOOD PRESSURE: 152 MMHG | BODY MASS INDEX: 33.88 KG/M2 | HEART RATE: 67 BPM | WEIGHT: 203.38 LBS | RESPIRATION RATE: 18 BRPM

## 2024-12-04 DIAGNOSIS — K21.9 GASTROESOPHAGEAL REFLUX DISEASE, UNSPECIFIED WHETHER ESOPHAGITIS PRESENT: ICD-10-CM

## 2024-12-04 DIAGNOSIS — I10 ESSENTIAL HYPERTENSION: ICD-10-CM

## 2024-12-04 DIAGNOSIS — J45.20 MILD INTERMITTENT ASTHMA WITHOUT COMPLICATION: Chronic | ICD-10-CM

## 2024-12-04 DIAGNOSIS — Z86.711 HISTORY OF PULMONARY EMBOLUS (PE): ICD-10-CM

## 2024-12-04 DIAGNOSIS — M17.11 ARTHRITIS OF RIGHT KNEE: ICD-10-CM

## 2024-12-04 DIAGNOSIS — E78.5 DYSLIPIDEMIA: ICD-10-CM

## 2024-12-04 DIAGNOSIS — Z79.899 OTHER LONG TERM (CURRENT) DRUG THERAPY: ICD-10-CM

## 2024-12-04 DIAGNOSIS — Z09 FOLLOW-UP EXAM: Primary | ICD-10-CM

## 2024-12-04 DIAGNOSIS — Z23 FLU VACCINE NEED: ICD-10-CM

## 2024-12-04 LAB
ALBUMIN SERPL BCP-MCNC: 3.8 G/DL (ref 3.4–4.8)
ALBUMIN/GLOB SERPL: 1 {RATIO}
ALP SERPL-CCNC: 87 U/L (ref 40–150)
ALT SERPL W P-5'-P-CCNC: 13 U/L
ANION GAP SERPL CALCULATED.3IONS-SCNC: 11 MMOL/L (ref 7–16)
AST SERPL W P-5'-P-CCNC: 37 U/L (ref 5–34)
BASOPHILS # BLD AUTO: 0.02 K/UL (ref 0–0.2)
BASOPHILS NFR BLD AUTO: 0.4 % (ref 0–1)
BILIRUB SERPL-MCNC: 0.4 MG/DL
BUN SERPL-MCNC: 15 MG/DL (ref 10–20)
BUN/CREAT SERPL: 21 (ref 6–20)
CALCIUM SERPL-MCNC: 9.6 MG/DL (ref 8.4–10.2)
CHLORIDE SERPL-SCNC: 105 MMOL/L (ref 98–107)
CHOLEST SERPL-MCNC: 183 MG/DL
CHOLEST/HDLC SERPL: 2.8 {RATIO}
CO2 SERPL-SCNC: 28 MMOL/L (ref 23–31)
CREAT SERPL-MCNC: 0.73 MG/DL (ref 0.55–1.02)
DIFFERENTIAL METHOD BLD: ABNORMAL
EGFR (NO RACE VARIABLE) (RUSH/TITUS): 90 ML/MIN/1.73M2
EOSINOPHIL # BLD AUTO: 0.05 K/UL (ref 0–0.5)
EOSINOPHIL NFR BLD AUTO: 1 % (ref 1–4)
ERYTHROCYTE [DISTWIDTH] IN BLOOD BY AUTOMATED COUNT: 14.7 % (ref 11.5–14.5)
EST. AVERAGE GLUCOSE BLD GHB EST-MCNC: 120 MG/DL
GLOBULIN SER-MCNC: 3.7 G/DL (ref 2–4)
GLUCOSE SERPL-MCNC: 96 MG/DL (ref 82–115)
HBA1C MFR BLD HPLC: 5.8 %
HCT VFR BLD AUTO: 43.1 % (ref 38–47)
HDLC SERPL-MCNC: 66 MG/DL (ref 35–60)
HGB BLD-MCNC: 13.3 G/DL (ref 12–16)
IMM GRANULOCYTES # BLD AUTO: 0.01 K/UL (ref 0–0.04)
IMM GRANULOCYTES NFR BLD: 0.2 % (ref 0–0.4)
LDLC SERPL CALC-MCNC: 100 MG/DL
LDLC/HDLC SERPL: 1.5 {RATIO}
LYMPHOCYTES # BLD AUTO: 1.72 K/UL (ref 1–4.8)
LYMPHOCYTES NFR BLD AUTO: 36 % (ref 27–41)
MCH RBC QN AUTO: 28.5 PG (ref 27–31)
MCHC RBC AUTO-ENTMCNC: 30.9 G/DL (ref 32–36)
MCV RBC AUTO: 92.3 FL (ref 80–96)
MONOCYTES # BLD AUTO: 0.45 K/UL (ref 0–0.8)
MONOCYTES NFR BLD AUTO: 9.4 % (ref 2–6)
MPC BLD CALC-MCNC: 9.8 FL (ref 9.4–12.4)
NEUTROPHILS # BLD AUTO: 2.53 K/UL (ref 1.8–7.7)
NEUTROPHILS NFR BLD AUTO: 53 % (ref 53–65)
NONHDLC SERPL-MCNC: 117 MG/DL
NRBC # BLD AUTO: 0 X10E3/UL
NRBC, AUTO (.00): 0 %
PLATELET # BLD AUTO: 352 K/UL (ref 150–400)
POTASSIUM SERPL-SCNC: 3.5 MMOL/L (ref 3.5–5.1)
PROT SERPL-MCNC: 7.5 G/DL (ref 5.8–7.6)
RBC # BLD AUTO: 4.67 M/UL (ref 4.2–5.4)
SODIUM SERPL-SCNC: 140 MMOL/L (ref 136–145)
TRIGL SERPL-MCNC: 84 MG/DL (ref 37–140)
VLDLC SERPL-MCNC: 17 MG/DL
WBC # BLD AUTO: 4.78 K/UL (ref 4.5–11)

## 2024-12-04 PROCEDURE — 1125F AMNT PAIN NOTED PAIN PRSNT: CPT | Mod: ,,, | Performed by: INTERNAL MEDICINE

## 2024-12-04 PROCEDURE — 1159F MED LIST DOCD IN RCRD: CPT | Mod: ,,, | Performed by: INTERNAL MEDICINE

## 2024-12-04 PROCEDURE — G0008 ADMIN INFLUENZA VIRUS VAC: HCPCS | Mod: ,,, | Performed by: INTERNAL MEDICINE

## 2024-12-04 PROCEDURE — 99214 OFFICE O/P EST MOD 30 MIN: CPT | Mod: 25,,, | Performed by: INTERNAL MEDICINE

## 2024-12-04 PROCEDURE — 3288F FALL RISK ASSESSMENT DOCD: CPT | Mod: ,,, | Performed by: INTERNAL MEDICINE

## 2024-12-04 PROCEDURE — 90662 IIV NO PRSV INCREASED AG IM: CPT | Mod: ,,, | Performed by: INTERNAL MEDICINE

## 2024-12-04 PROCEDURE — 1101F PT FALLS ASSESS-DOCD LE1/YR: CPT | Mod: ,,, | Performed by: INTERNAL MEDICINE

## 2024-12-04 PROCEDURE — 3079F DIAST BP 80-89 MM HG: CPT | Mod: ,,, | Performed by: INTERNAL MEDICINE

## 2024-12-04 PROCEDURE — 3008F BODY MASS INDEX DOCD: CPT | Mod: ,,, | Performed by: INTERNAL MEDICINE

## 2024-12-04 PROCEDURE — 80061 LIPID PANEL: CPT | Mod: ,,, | Performed by: CLINICAL MEDICAL LABORATORY

## 2024-12-04 PROCEDURE — 85025 COMPLETE CBC W/AUTO DIFF WBC: CPT | Mod: ,,, | Performed by: CLINICAL MEDICAL LABORATORY

## 2024-12-04 PROCEDURE — 83036 HEMOGLOBIN GLYCOSYLATED A1C: CPT | Mod: ,,, | Performed by: CLINICAL MEDICAL LABORATORY

## 2024-12-04 PROCEDURE — 80053 COMPREHEN METABOLIC PANEL: CPT | Mod: ,,, | Performed by: CLINICAL MEDICAL LABORATORY

## 2024-12-04 PROCEDURE — 3077F SYST BP >= 140 MM HG: CPT | Mod: ,,, | Performed by: INTERNAL MEDICINE

## 2024-12-04 RX ORDER — HYDRALAZINE HYDROCHLORIDE 50 MG/1
50 TABLET, FILM COATED ORAL EVERY 8 HOURS
Qty: 270 TABLET | Refills: 1 | Status: SHIPPED | OUTPATIENT
Start: 2024-12-04 | End: 2025-11-29

## 2024-12-04 RX ORDER — PROPRANOLOL HYDROCHLORIDE 120 MG/1
120 CAPSULE, EXTENDED RELEASE ORAL DAILY
Qty: 90 CAPSULE | Refills: 3 | Status: SHIPPED | OUTPATIENT
Start: 2024-12-04 | End: 2025-12-04

## 2024-12-04 RX ORDER — METHOCARBAMOL 750 MG/1
750 TABLET, FILM COATED ORAL 4 TIMES DAILY PRN
Qty: 40 TABLET | Refills: 3 | Status: SHIPPED | OUTPATIENT
Start: 2024-12-04

## 2024-12-04 RX ORDER — PREGABALIN 50 MG/1
50 CAPSULE ORAL 2 TIMES DAILY
Qty: 60 CAPSULE | Refills: 5 | Status: SHIPPED | OUTPATIENT
Start: 2024-12-04

## 2024-12-04 NOTE — PROGRESS NOTES
Subjective:       Patient ID: Kash Dumont is a 69 y.o. female.    Chief Complaint: Follow-up        12/4/24-Mrs. Dumont is a 69-year-old female the presents today for follow-up.  She continues to have some issues with right knee pain.  It is worse 1st thing in the morning and gets a little better throughout the day.  The Celebrex does help.  She had an injection in her right knee with Orthopedics back in March of 2024.  She then underwent some physical therapy back in September that helped some..  Otherwise she is doing okay.  Her blood pressure is up today at 152/86.  Otherwise she is doing okay.  We have discussed recommendations regarding vaccines and we will administer the flu vaccine today.  She is currently resting comfortably in no distress.  She is afebrile and vital signs are stable.  Colonoscopy scheduled for February 14, 2025    Follow-up  Associated symptoms include arthralgias. Pertinent negatives include no abdominal pain, chest pain, chills, congestion, coughing, fatigue, fever, headaches, joint swelling, myalgias, nausea, neck pain, rash, sore throat or weakness.   Knee Pain       Review of Systems   Constitutional:  Negative for appetite change, chills, fatigue and fever.   HENT:  Negative for nasal congestion, ear pain, hearing loss, sinus pressure/congestion and sore throat.    Eyes:  Negative for pain, redness and visual disturbance.   Respiratory:  Negative for apnea, cough, shortness of breath and wheezing.    Cardiovascular:  Negative for chest pain and palpitations.   Gastrointestinal:  Negative for abdominal pain, constipation, diarrhea and nausea.   Endocrine: Negative for cold intolerance, heat intolerance and polyuria.   Genitourinary:  Negative for dysuria and hematuria.   Musculoskeletal:  Positive for arthralgias. Negative for back pain, joint swelling, myalgias and neck pain.   Integumentary:  Negative for color change, pallor, rash and wound.   Allergic/Immunologic: Negative for  immunocompromised state.   Neurological:  Negative for tremors, seizures, weakness, headaches and memory loss.   Hematological:  Negative for adenopathy.   Psychiatric/Behavioral:  Negative for confusion, dysphoric mood and sleep disturbance. The patient is not nervous/anxious.          Objective:      Physical Exam  Vitals and nursing note reviewed.   Constitutional:       General: She is not in acute distress.     Appearance: Normal appearance. She is obese. She is not ill-appearing.   HENT:      Head: Normocephalic and atraumatic.      Right Ear: External ear normal.      Left Ear: External ear normal.      Nose: Nose normal.      Mouth/Throat:      Pharynx: Oropharynx is clear.   Eyes:      Extraocular Movements: Extraocular movements intact.      Conjunctiva/sclera: Conjunctivae normal.      Pupils: Pupils are equal, round, and reactive to light.   Neck:      Vascular: No carotid bruit.   Cardiovascular:      Rate and Rhythm: Normal rate and regular rhythm.      Pulses: Normal pulses.      Heart sounds: Normal heart sounds. No murmur heard.  Pulmonary:      Effort: No respiratory distress.      Breath sounds: Normal breath sounds. No wheezing or rales.   Abdominal:      General: Bowel sounds are normal.      Palpations: Abdomen is soft.   Musculoskeletal:         General: Tenderness present. Normal range of motion.      Cervical back: Normal range of motion and neck supple.      Right lower leg: No edema.      Left lower leg: No edema.      Comments: Right knee tenderness   Skin:     General: Skin is warm and dry.      Capillary Refill: Capillary refill takes less than 2 seconds.      Coloration: Skin is not pale.      Comments: Places on extensor surfaces of arms of small losses of saazufoqrnof-arymeepc-bkiix like idiopathic guttate hypomelanosis   Neurological:      General: No focal deficit present.      Mental Status: She is alert and oriented to person, place, and time.      Cranial Nerves: No cranial  nerve deficit.      Motor: No weakness.      Gait: Gait normal.   Psychiatric:         Mood and Affect: Mood normal.         Judgment: Judgment normal.         Assessment:       Problem List Items Addressed This Visit          Pulmonary    Mild intermittent asthma without complication (Chronic)       Cardiac/Vascular    Essential hypertension    Relevant Orders    Comprehensive Metabolic Panel    CBC Auto Differential    Hemoglobin A1C    Dyslipidemia    Relevant Orders    Hemoglobin A1C    Lipid Panel       Hematology    History of pulmonary embolus (PE)       GI    Gastroesophageal reflux disease       Orthopedic    Arthritis of right knee     Other Visit Diagnoses       Follow-up exam    -  Primary    Relevant Medications    pregabalin (LYRICA) 50 MG capsule    Flu vaccine need        Relevant Medications    influenza (High-Dose) (Fluzone High-Dose) 180 mcg/0.5 mL IM vaccine (> or = 64 yo) 0.5 mL (Completed) (Start on 12/4/2024  9:45 AM)    Other long term (current) drug therapy        Relevant Orders    Hemoglobin A1C            Plan:       1.  The patient presents today for follow up.  Age-appropriate health screenings are up-to-date.  Colonoscopy scheduled for February 14, 2025.  Plan for flu vaccine today.  We have also discussed shingles and RSV vaccine.  She is in contemplation stage.  She can get these done through her pharmacy.    2. Asthma-stable with no acute issues.  She is on Advair and ventolin as needed.  She also takes Singulair.    3. Essential hypertension-blood pressure l is elevated today at 152/86 and has been elevated last couple times it has been checked.  We are going to increase her hydralazine to 50 mg t.i.d..  We are going to increase her losartan-hydrochlorothiazide to 100-25.  She was on 100-12.5.  She is also on propranolol    4.GERD- stable. Continue with ppi    5. Osteoarthritis- mainly her right knee bothering her now.  She has been seen by Orthopedics.  She has had an injection in  the right knee and also completed some physical therapy.  Currently doing okay.  It does respond to the Celebrex    6. Dyslipidemia-recent lipid panel done on March 10, 2023 with total cholesterol of 258, HDL 84 and .  Based on current statin guidelines a moderate intensity statin is recommended.    She is tolerating atorvastatin 10 mg.  We are going to repeat a lipid panel today    Billing for this encounter based on moderate level of medical decision-making    She will f/u in 6 months or sooner if needed

## 2024-12-13 DIAGNOSIS — Z09 FOLLOW-UP EXAM: ICD-10-CM

## 2024-12-16 RX ORDER — PREGABALIN 50 MG/1
50 CAPSULE ORAL 2 TIMES DAILY
Qty: 60 CAPSULE | Refills: 5 | Status: SHIPPED | OUTPATIENT
Start: 2024-12-16

## 2024-12-16 RX ORDER — DICLOFENAC SODIUM 10 MG/G
GEL TOPICAL 3 TIMES DAILY
Qty: 100 G | Refills: 1 | Status: SHIPPED | OUTPATIENT
Start: 2024-12-16

## 2025-02-10 RX ORDER — METHOCARBAMOL 750 MG/1
750 TABLET, FILM COATED ORAL 4 TIMES DAILY PRN
Qty: 40 TABLET | Refills: 3 | Status: SHIPPED | OUTPATIENT
Start: 2025-02-10

## 2025-02-22 DIAGNOSIS — J45.20 MILD INTERMITTENT ASTHMA WITHOUT COMPLICATION: Chronic | ICD-10-CM

## 2025-02-22 DIAGNOSIS — Z09 FOLLOW-UP EXAM: ICD-10-CM

## 2025-02-25 RX ORDER — PREGABALIN 50 MG/1
50 CAPSULE ORAL 2 TIMES DAILY
Qty: 60 CAPSULE | Refills: 5 | Status: SHIPPED | OUTPATIENT
Start: 2025-02-25

## 2025-02-25 RX ORDER — ALBUTEROL SULFATE 90 UG/1
INHALANT RESPIRATORY (INHALATION)
Qty: 8.5 G | Refills: 6 | Status: SHIPPED | OUTPATIENT
Start: 2025-02-25

## 2025-03-05 DIAGNOSIS — J45.20 MILD INTERMITTENT ASTHMA WITHOUT COMPLICATION: Primary | ICD-10-CM

## 2025-03-10 RX ORDER — MONTELUKAST SODIUM 10 MG/1
10 TABLET ORAL
Qty: 90 TABLET | Refills: 3 | Status: SHIPPED | OUTPATIENT
Start: 2025-03-10

## 2025-04-09 RX ORDER — PROPRANOLOL HYDROCHLORIDE 120 MG/1
120 CAPSULE, EXTENDED RELEASE ORAL
Qty: 90 CAPSULE | Refills: 3 | Status: SHIPPED | OUTPATIENT
Start: 2025-04-09

## 2025-04-15 DIAGNOSIS — I10 ESSENTIAL HYPERTENSION: Primary | ICD-10-CM

## 2025-04-15 DIAGNOSIS — M25.551 RIGHT HIP PAIN: ICD-10-CM

## 2025-04-15 RX ORDER — CELECOXIB 200 MG/1
CAPSULE ORAL
Qty: 90 CAPSULE | Refills: 3 | Status: SHIPPED | OUTPATIENT
Start: 2025-04-15

## 2025-04-15 RX ORDER — LOSARTAN POTASSIUM AND HYDROCHLOROTHIAZIDE 25; 100 MG/1; MG/1
1 TABLET ORAL
Qty: 90 TABLET | Refills: 3 | Status: SHIPPED | OUTPATIENT
Start: 2025-04-15

## 2025-04-21 DIAGNOSIS — J45.20 MILD INTERMITTENT ASTHMA WITHOUT COMPLICATION: Primary | ICD-10-CM

## 2025-04-21 RX ORDER — ATORVASTATIN CALCIUM 10 MG/1
10 TABLET, FILM COATED ORAL
Qty: 90 TABLET | Refills: 3 | Status: SHIPPED | OUTPATIENT
Start: 2025-04-21

## 2025-04-22 RX ORDER — FLUTICASONE PROPIONATE AND SALMETEROL 50; 500 UG/1; UG/1
1 POWDER RESPIRATORY (INHALATION) 2 TIMES DAILY
Qty: 60 EACH | Refills: 11 | Status: SHIPPED | OUTPATIENT
Start: 2025-04-22 | End: 2026-04-22

## 2025-04-23 ENCOUNTER — TELEPHONE (OUTPATIENT)
Dept: FAMILY MEDICINE | Facility: CLINIC | Age: 70
End: 2025-04-23
Payer: MEDICARE

## 2025-04-23 RX ORDER — ATORVASTATIN CALCIUM 10 MG/1
10 TABLET, FILM COATED ORAL
Qty: 90 TABLET | Refills: 3 | OUTPATIENT
Start: 2025-04-23

## 2025-04-23 NOTE — TELEPHONE ENCOUNTER
----- Message from Missy sent at 4/23/2025 11:37 AM CDT -----  Regarding: ES  Patient returned call.

## 2025-04-23 NOTE — TELEPHONE ENCOUNTER
----- Message from Edwige sent at 4/23/2025 11:23 AM CDT -----  Regarding: PA RX Refill  Who Called: Kash DumontRefill or New Rx:RefillRX Name and Strength:atorvastatin (LIPITOR) 10 MG tabletHow is the patient currently taking it? (ex. 1XDay):TAKE 1 TABLET(10 MG) BY MOUTH DAILYIs this a 30 day or 90 day RX:90Local or Mail Order:localList of preferred pharmacies on file (remove unneeded): [unfilled]If different Pharmacy is requested, enter Pharmacy information here including location and phone number: [id number] - [place], [place] - [address] AT [facility] [id number] [facility] [address]Jeqif: [phone] Fax: [phone]: Not open 24 hours Ordering Provider:Ollie Dixon Method of Contact: Phone CallPatient's Preferred Phone Number on File: [phone] Best Call Back Number, if different:

## 2025-04-29 DIAGNOSIS — Z09 FOLLOW-UP EXAM: ICD-10-CM

## 2025-04-29 RX ORDER — PREGABALIN 50 MG/1
50 CAPSULE ORAL 2 TIMES DAILY
Qty: 60 CAPSULE | Refills: 5 | Status: SHIPPED | OUTPATIENT
Start: 2025-04-29

## 2025-05-28 ENCOUNTER — TELEPHONE (OUTPATIENT)
Dept: GASTROENTEROLOGY | Facility: HOSPITAL | Age: 70
End: 2025-05-28
Payer: MEDICARE

## 2025-05-29 DIAGNOSIS — Z09 FOLLOW-UP EXAM: ICD-10-CM

## 2025-06-03 RX ORDER — ATORVASTATIN CALCIUM 10 MG/1
10 TABLET, FILM COATED ORAL NIGHTLY
Qty: 90 TABLET | Refills: 3 | Status: SHIPPED | OUTPATIENT
Start: 2025-06-03

## 2025-06-03 RX ORDER — PREGABALIN 50 MG/1
50 CAPSULE ORAL 2 TIMES DAILY
Qty: 60 CAPSULE | Refills: 5 | Status: SHIPPED | OUTPATIENT
Start: 2025-06-03

## 2025-06-03 RX ORDER — METHOCARBAMOL 750 MG/1
750 TABLET, FILM COATED ORAL 4 TIMES DAILY PRN
Qty: 40 TABLET | Refills: 3 | Status: SHIPPED | OUTPATIENT
Start: 2025-06-03

## 2025-06-05 RX ORDER — HYDRALAZINE HYDROCHLORIDE 50 MG/1
TABLET, FILM COATED ORAL
Qty: 270 TABLET | Refills: 3 | Status: SHIPPED | OUTPATIENT
Start: 2025-06-05

## 2025-07-07 ENCOUNTER — OFFICE VISIT (OUTPATIENT)
Dept: FAMILY MEDICINE | Facility: CLINIC | Age: 70
End: 2025-07-07
Payer: MEDICARE

## 2025-07-07 VITALS
DIASTOLIC BLOOD PRESSURE: 92 MMHG | WEIGHT: 191.69 LBS | BODY MASS INDEX: 31.94 KG/M2 | TEMPERATURE: 97 F | HEIGHT: 65 IN | OXYGEN SATURATION: 96 % | HEART RATE: 71 BPM | SYSTOLIC BLOOD PRESSURE: 182 MMHG

## 2025-07-07 DIAGNOSIS — J45.20 MILD INTERMITTENT ASTHMA WITHOUT COMPLICATION: Chronic | ICD-10-CM

## 2025-07-07 DIAGNOSIS — E78.5 DYSLIPIDEMIA: ICD-10-CM

## 2025-07-07 DIAGNOSIS — Z09 FOLLOW-UP EXAM: Primary | ICD-10-CM

## 2025-07-07 DIAGNOSIS — I10 ESSENTIAL HYPERTENSION: ICD-10-CM

## 2025-07-07 DIAGNOSIS — K21.9 GASTROESOPHAGEAL REFLUX DISEASE, UNSPECIFIED WHETHER ESOPHAGITIS PRESENT: ICD-10-CM

## 2025-07-07 DIAGNOSIS — M17.11 ARTHRITIS OF RIGHT KNEE: ICD-10-CM

## 2025-07-07 PROCEDURE — 3008F BODY MASS INDEX DOCD: CPT | Mod: ,,, | Performed by: INTERNAL MEDICINE

## 2025-07-07 PROCEDURE — 99214 OFFICE O/P EST MOD 30 MIN: CPT | Mod: 25,,, | Performed by: INTERNAL MEDICINE

## 2025-07-07 PROCEDURE — 96372 THER/PROPH/DIAG INJ SC/IM: CPT | Mod: ,,, | Performed by: INTERNAL MEDICINE

## 2025-07-07 PROCEDURE — 1159F MED LIST DOCD IN RCRD: CPT | Mod: ,,, | Performed by: INTERNAL MEDICINE

## 2025-07-07 PROCEDURE — 3288F FALL RISK ASSESSMENT DOCD: CPT | Mod: ,,, | Performed by: INTERNAL MEDICINE

## 2025-07-07 PROCEDURE — 3077F SYST BP >= 140 MM HG: CPT | Mod: ,,, | Performed by: INTERNAL MEDICINE

## 2025-07-07 PROCEDURE — 1101F PT FALLS ASSESS-DOCD LE1/YR: CPT | Mod: ,,, | Performed by: INTERNAL MEDICINE

## 2025-07-07 PROCEDURE — 3080F DIAST BP >= 90 MM HG: CPT | Mod: ,,, | Performed by: INTERNAL MEDICINE

## 2025-07-07 RX ORDER — HYDRALAZINE HYDROCHLORIDE 100 MG/1
100 TABLET, FILM COATED ORAL EVERY 8 HOURS
Qty: 270 TABLET | Refills: 3 | Status: SHIPPED | OUTPATIENT
Start: 2025-07-07

## 2025-07-07 RX ORDER — KETOROLAC TROMETHAMINE 30 MG/ML
30 INJECTION, SOLUTION INTRAMUSCULAR; INTRAVENOUS
Status: COMPLETED | OUTPATIENT
Start: 2025-07-07 | End: 2025-07-07

## 2025-07-07 RX ADMIN — KETOROLAC TROMETHAMINE 30 MG: 30 INJECTION, SOLUTION INTRAMUSCULAR; INTRAVENOUS at 09:07

## 2025-07-07 NOTE — PROGRESS NOTES
Subjective:       Patient ID: Kash Dumont is a 70 y.o. female.    Chief Complaint: Follow-up (6m) and Health Maintenance (Hepatitis C Screening Never done/Shingles Vaccine(1 of 2) Never done/Colorectal Cancer Screening due on 10/02/2024/)        07/07/2025-Mrs. Dumont is a 70-year-old female the presents today for follow-up.  She continues to have some issues with right knee pain.  It is worse 1st thing in the morning and gets a little better throughout the day.  The Celebrex does help.  She has had an injection but it has been more than a year.   Otherwise she denies any significant changes in her health since we last saw her.  Her blood pressure is up today at 182/100.  We are going to repeat that before we let her go.  Otherwise she is doing okay.  She has a colonoscopy scheduled for September 22, 2025 and a mammogram scheduled for November 5, 2025.  She is resting comfortably today in no distress.    Follow-up  Associated symptoms include arthralgias. Pertinent negatives include no abdominal pain, chest pain, chills, congestion, coughing, fatigue, fever, headaches, joint swelling, myalgias, nausea, neck pain, rash, sore throat or weakness.   Knee Pain       Review of Systems   Constitutional:  Negative for appetite change, chills, fatigue and fever.   HENT:  Negative for nasal congestion, ear pain, hearing loss, sinus pressure/congestion and sore throat.    Eyes:  Negative for pain, redness and visual disturbance.   Respiratory:  Negative for apnea, cough, shortness of breath and wheezing.    Cardiovascular:  Negative for chest pain and palpitations.   Gastrointestinal:  Negative for abdominal pain, constipation, diarrhea and nausea.   Endocrine: Negative for cold intolerance, heat intolerance and polyuria.   Genitourinary:  Negative for dysuria and hematuria.   Musculoskeletal:  Positive for arthralgias. Negative for back pain, joint swelling, myalgias and neck pain.   Integumentary:  Negative for color  change, pallor, rash and wound.   Allergic/Immunologic: Negative for immunocompromised state.   Neurological:  Negative for tremors, seizures, weakness, headaches and memory loss.   Hematological:  Negative for adenopathy.   Psychiatric/Behavioral:  Negative for confusion, dysphoric mood and sleep disturbance. The patient is not nervous/anxious.          Objective:      Physical Exam  Vitals and nursing note reviewed.   Constitutional:       General: She is not in acute distress.     Appearance: Normal appearance. She is obese. She is not ill-appearing.   HENT:      Head: Normocephalic and atraumatic.      Right Ear: External ear normal.      Left Ear: External ear normal.      Nose: Nose normal.      Mouth/Throat:      Pharynx: Oropharynx is clear.   Eyes:      Extraocular Movements: Extraocular movements intact.      Conjunctiva/sclera: Conjunctivae normal.      Pupils: Pupils are equal, round, and reactive to light.   Neck:      Vascular: No carotid bruit.   Cardiovascular:      Rate and Rhythm: Normal rate and regular rhythm.      Pulses: Normal pulses.      Heart sounds: Normal heart sounds. No murmur heard.  Pulmonary:      Effort: No respiratory distress.      Breath sounds: Normal breath sounds. No wheezing or rales.   Abdominal:      General: Bowel sounds are normal.      Palpations: Abdomen is soft.   Musculoskeletal:         General: Tenderness present. Normal range of motion.      Cervical back: Normal range of motion and neck supple.      Right lower leg: No edema.      Left lower leg: No edema.      Comments: Right knee tenderness   Skin:     General: Skin is warm and dry.      Capillary Refill: Capillary refill takes less than 2 seconds.      Coloration: Skin is not pale.      Comments: Places on extensor surfaces of arms of small losses of jotyuioiufmi-iqxwzebe-wjvqr like idiopathic guttate hypomelanosis   Neurological:      General: No focal deficit present.      Mental Status: She is alert and  oriented to person, place, and time.      Cranial Nerves: No cranial nerve deficit.      Motor: No weakness.      Gait: Gait normal.   Psychiatric:         Mood and Affect: Mood normal.         Judgment: Judgment normal.         Assessment:       Problem List Items Addressed This Visit       Mild intermittent asthma without complication (Chronic)    Essential hypertension    Gastroesophageal reflux disease    Dyslipidemia    Arthritis of right knee    Relevant Orders    Ambulatory referral/consult to Orthopedics     Other Visit Diagnoses         Follow-up exam    -  Primary            Plan:       1.  The patient presents today for follow up.  Age-appropriate health screenings are up-to-date.  Colonoscopy scheduled for September 22nd, 2025.  Mammogram scheduled for November 5, 2025.  We have also discussed shingles and RSV vaccine.  She is in contemplation stage.  She can get these done through her pharmacy.    2. Asthma-stable with no acute issues.  She is on Advair and ventolin as needed.  She also takes Singulair.    3. Essential hypertension-blood pressure remains elevated.  Initial blood pressure today 182/100.  I am going to increase her hydralazine to 100 mg 3 times a day.  We will continue with the losartan-hydrochlorothiazide 100-25.  She is also on propranolol 120 mg daily.  If we are still having issues I am going to look to add amlodipine.    4.GERD- stable. Continue with ppi    5. Osteoarthritis- mainly her right knee bothering her now.  Plan for Toradol injection now.  We are going to refer her back to Orthopedics.    6. Dyslipidemia-lipid panel done in December of 2024.  She is supposed to be on atorvastatin 10 mg daily.    Billing for this encounter based on moderate level of medical decision-making    She will f/u in 6 months or sooner if needed

## 2025-07-11 RX ORDER — PROPRANOLOL HYDROCHLORIDE 120 MG/1
120 CAPSULE, EXTENDED RELEASE ORAL
Qty: 90 CAPSULE | Refills: 3 | Status: SHIPPED | OUTPATIENT
Start: 2025-07-11

## 2025-07-21 ENCOUNTER — CLINICAL SUPPORT (OUTPATIENT)
Dept: FAMILY MEDICINE | Facility: CLINIC | Age: 70
End: 2025-07-21
Payer: MEDICARE

## 2025-07-21 VITALS — SYSTOLIC BLOOD PRESSURE: 124 MMHG | DIASTOLIC BLOOD PRESSURE: 80 MMHG

## 2025-07-21 DIAGNOSIS — Z01.30 BP CHECK: Primary | ICD-10-CM

## 2025-07-21 NOTE — PROGRESS NOTES
Pt came in for bp check only after increasing meds bp was 124/80 pt states meds are working fine

## 2025-08-11 ENCOUNTER — HOSPITAL ENCOUNTER (OUTPATIENT)
Dept: RADIOLOGY | Facility: HOSPITAL | Age: 70
Discharge: HOME | End: 2025-08-11
Attending: ORTHOPAEDIC SURGERY
Payer: MEDICARE

## 2025-08-11 ENCOUNTER — OFFICE VISIT (OUTPATIENT)
Dept: ORTHOPEDICS | Facility: CLINIC | Age: 70
End: 2025-08-11
Payer: MEDICARE

## 2025-08-11 VITALS
HEIGHT: 65 IN | OXYGEN SATURATION: 95 % | DIASTOLIC BLOOD PRESSURE: 94 MMHG | SYSTOLIC BLOOD PRESSURE: 190 MMHG | WEIGHT: 191.81 LBS | HEART RATE: 73 BPM | BODY MASS INDEX: 31.96 KG/M2

## 2025-08-11 DIAGNOSIS — M17.11 ARTHRITIS OF RIGHT KNEE: Primary | ICD-10-CM

## 2025-08-11 DIAGNOSIS — M17.11 ARTHRITIS OF RIGHT KNEE: ICD-10-CM

## 2025-08-11 PROCEDURE — 1101F PT FALLS ASSESS-DOCD LE1/YR: CPT | Mod: CPTII,,, | Performed by: ORTHOPAEDIC SURGERY

## 2025-08-11 PROCEDURE — 20610 DRAIN/INJ JOINT/BURSA W/O US: CPT | Mod: PBBFAC,RT | Performed by: ORTHOPAEDIC SURGERY

## 2025-08-11 PROCEDURE — 3077F SYST BP >= 140 MM HG: CPT | Mod: CPTII,,, | Performed by: ORTHOPAEDIC SURGERY

## 2025-08-11 PROCEDURE — 99215 OFFICE O/P EST HI 40 MIN: CPT | Mod: PBBFAC,25 | Performed by: ORTHOPAEDIC SURGERY

## 2025-08-11 PROCEDURE — 99999 PR PBB SHADOW E&M-EST. PATIENT-LVL V: CPT | Mod: PBBFAC,,, | Performed by: ORTHOPAEDIC SURGERY

## 2025-08-11 PROCEDURE — 99213 OFFICE O/P EST LOW 20 MIN: CPT | Mod: S$PBB,25,, | Performed by: ORTHOPAEDIC SURGERY

## 2025-08-11 PROCEDURE — 73564 X-RAY EXAM KNEE 4 OR MORE: CPT | Mod: 26,RT,, | Performed by: ORTHOPAEDIC SURGERY

## 2025-08-11 PROCEDURE — 3080F DIAST BP >= 90 MM HG: CPT | Mod: CPTII,,, | Performed by: ORTHOPAEDIC SURGERY

## 2025-08-11 PROCEDURE — 73564 X-RAY EXAM KNEE 4 OR MORE: CPT | Mod: TC,RT

## 2025-08-11 PROCEDURE — 99999PBSHW PR PBB SHADOW TECHNICAL ONLY FILED TO HB: Mod: PBBFAC,,,

## 2025-08-11 PROCEDURE — 1159F MED LIST DOCD IN RCRD: CPT | Mod: CPTII,,, | Performed by: ORTHOPAEDIC SURGERY

## 2025-08-11 PROCEDURE — 3008F BODY MASS INDEX DOCD: CPT | Mod: CPTII,,, | Performed by: ORTHOPAEDIC SURGERY

## 2025-08-11 PROCEDURE — 3288F FALL RISK ASSESSMENT DOCD: CPT | Mod: CPTII,,, | Performed by: ORTHOPAEDIC SURGERY

## 2025-08-11 RX ORDER — BUPIVACAINE HYDROCHLORIDE 2.5 MG/ML
1 INJECTION, SOLUTION EPIDURAL; INFILTRATION; INTRACAUDAL; PERINEURAL
Status: DISCONTINUED | OUTPATIENT
Start: 2025-08-11 | End: 2025-08-11 | Stop reason: HOSPADM

## 2025-08-11 RX ORDER — TRIAMCINOLONE ACETONIDE 40 MG/ML
40 INJECTION, SUSPENSION INTRA-ARTICULAR; INTRAMUSCULAR
Status: DISCONTINUED | OUTPATIENT
Start: 2025-08-11 | End: 2025-08-11 | Stop reason: HOSPADM

## 2025-08-11 RX ADMIN — BUPIVACAINE HYDROCHLORIDE 1 ML: 2.5 INJECTION, SOLUTION EPIDURAL; INFILTRATION; INTRACAUDAL; PERINEURAL at 08:08

## 2025-08-11 RX ADMIN — TRIAMCINOLONE ACETONIDE 40 MG: 40 INJECTION, SUSPENSION INTRA-ARTICULAR; INTRAMUSCULAR at 08:08

## 2025-08-14 ENCOUNTER — TELEPHONE (OUTPATIENT)
Dept: PHARMACY | Facility: CLINIC | Age: 70
End: 2025-08-14